# Patient Record
Sex: MALE | Race: WHITE | NOT HISPANIC OR LATINO | Employment: OTHER | URBAN - METROPOLITAN AREA
[De-identification: names, ages, dates, MRNs, and addresses within clinical notes are randomized per-mention and may not be internally consistent; named-entity substitution may affect disease eponyms.]

---

## 2017-08-18 ENCOUNTER — HOSPITAL ENCOUNTER (EMERGENCY)
Facility: HOSPITAL | Age: 52
Discharge: HOME | End: 2017-08-18
Attending: EMERGENCY MEDICINE | Admitting: EMERGENCY MEDICINE

## 2017-08-18 ENCOUNTER — APPOINTMENT (OUTPATIENT)
Dept: CT IMAGING | Facility: HOSPITAL | Age: 52
End: 2017-08-18

## 2017-08-18 VITALS
WEIGHT: 235 LBS | HEIGHT: 70 IN | TEMPERATURE: 96.6 F | RESPIRATION RATE: 16 BRPM | SYSTOLIC BLOOD PRESSURE: 133 MMHG | DIASTOLIC BLOOD PRESSURE: 86 MMHG | HEART RATE: 66 BPM | BODY MASS INDEX: 33.64 KG/M2 | OXYGEN SATURATION: 97 %

## 2017-08-18 DIAGNOSIS — N20.1 URETEROLITHIASIS: Primary | ICD-10-CM

## 2017-08-18 LAB
ALBUMIN SERPL-MCNC: 4.6 G/DL (ref 3.5–5.2)
ALBUMIN/GLOB SERPL: 1.5 G/DL
ALP SERPL-CCNC: 82 U/L (ref 39–117)
ALT SERPL W P-5'-P-CCNC: 26 U/L (ref 1–41)
ANION GAP SERPL CALCULATED.3IONS-SCNC: 16.4 MMOL/L
AST SERPL-CCNC: 22 U/L (ref 1–40)
BACTERIA UR QL AUTO: ABNORMAL /HPF
BASOPHILS # BLD AUTO: 0.04 10*3/MM3 (ref 0–0.2)
BASOPHILS NFR BLD AUTO: 0.4 % (ref 0–1.5)
BILIRUB SERPL-MCNC: 0.4 MG/DL (ref 0.1–1.2)
BILIRUB UR QL STRIP: NEGATIVE
BUN BLD-MCNC: 19 MG/DL (ref 6–20)
BUN/CREAT SERPL: 18.4 (ref 7–25)
CALCIUM SPEC-SCNC: 10 MG/DL (ref 8.6–10.5)
CHLORIDE SERPL-SCNC: 100 MMOL/L (ref 98–107)
CLARITY UR: ABNORMAL
CO2 SERPL-SCNC: 22.6 MMOL/L (ref 22–29)
COLOR UR: ABNORMAL
CREAT BLD-MCNC: 1.03 MG/DL (ref 0.76–1.27)
DEPRECATED RDW RBC AUTO: 40.9 FL (ref 37–54)
EOSINOPHIL # BLD AUTO: 0.12 10*3/MM3 (ref 0–0.7)
EOSINOPHIL NFR BLD AUTO: 1.2 % (ref 0.3–6.2)
ERYTHROCYTE [DISTWIDTH] IN BLOOD BY AUTOMATED COUNT: 12.2 % (ref 11.5–14.5)
GFR SERPL CREATININE-BSD FRML MDRD: 76 ML/MIN/1.73
GLOBULIN UR ELPH-MCNC: 3.1 GM/DL
GLUCOSE BLD-MCNC: 176 MG/DL (ref 65–99)
GLUCOSE UR STRIP-MCNC: ABNORMAL MG/DL
HCT VFR BLD AUTO: 43.9 % (ref 40.4–52.2)
HGB BLD-MCNC: 15.1 G/DL (ref 13.7–17.6)
HGB UR QL STRIP.AUTO: ABNORMAL
HOLD SPECIMEN: NORMAL
HOLD SPECIMEN: NORMAL
HYALINE CASTS UR QL AUTO: ABNORMAL /LPF
IMM GRANULOCYTES # BLD: 0.04 10*3/MM3 (ref 0–0.03)
IMM GRANULOCYTES NFR BLD: 0.4 % (ref 0–0.5)
KETONES UR QL STRIP: ABNORMAL
LEUKOCYTE ESTERASE UR QL STRIP.AUTO: ABNORMAL
LIPASE SERPL-CCNC: 59 U/L (ref 13–60)
LYMPHOCYTES # BLD AUTO: 1.14 10*3/MM3 (ref 0.9–4.8)
LYMPHOCYTES NFR BLD AUTO: 11.8 % (ref 19.6–45.3)
MCH RBC QN AUTO: 31.5 PG (ref 27–32.7)
MCHC RBC AUTO-ENTMCNC: 34.4 G/DL (ref 32.6–36.4)
MCV RBC AUTO: 91.6 FL (ref 79.8–96.2)
MONOCYTES # BLD AUTO: 0.53 10*3/MM3 (ref 0.2–1.2)
MONOCYTES NFR BLD AUTO: 5.5 % (ref 5–12)
NEUTROPHILS # BLD AUTO: 7.82 10*3/MM3 (ref 1.9–8.1)
NEUTROPHILS NFR BLD AUTO: 80.7 % (ref 42.7–76)
NITRITE UR QL STRIP: NEGATIVE
PH UR STRIP.AUTO: 5.5 [PH] (ref 5–8)
PLATELET # BLD AUTO: 239 10*3/MM3 (ref 140–500)
PMV BLD AUTO: 10.2 FL (ref 6–12)
POTASSIUM BLD-SCNC: 3.7 MMOL/L (ref 3.5–5.2)
PROT SERPL-MCNC: 7.7 G/DL (ref 6–8.5)
PROT UR QL STRIP: ABNORMAL
RBC # BLD AUTO: 4.79 10*6/MM3 (ref 4.6–6)
RBC # UR: ABNORMAL /HPF
REF LAB TEST METHOD: ABNORMAL
SODIUM BLD-SCNC: 139 MMOL/L (ref 136–145)
SP GR UR STRIP: 1.02 (ref 1–1.03)
SQUAMOUS #/AREA URNS HPF: ABNORMAL /HPF
UROBILINOGEN UR QL STRIP: ABNORMAL
WBC NRBC COR # BLD: 9.69 10*3/MM3 (ref 4.5–10.7)
WBC UR QL AUTO: ABNORMAL /HPF
WHOLE BLOOD HOLD SPECIMEN: NORMAL
WHOLE BLOOD HOLD SPECIMEN: NORMAL

## 2017-08-18 PROCEDURE — 81001 URINALYSIS AUTO W/SCOPE: CPT | Performed by: EMERGENCY MEDICINE

## 2017-08-18 PROCEDURE — 96376 TX/PRO/DX INJ SAME DRUG ADON: CPT

## 2017-08-18 PROCEDURE — 96361 HYDRATE IV INFUSION ADD-ON: CPT

## 2017-08-18 PROCEDURE — 96375 TX/PRO/DX INJ NEW DRUG ADDON: CPT

## 2017-08-18 PROCEDURE — 25010000002 HYDROMORPHONE PER 4 MG: Performed by: EMERGENCY MEDICINE

## 2017-08-18 PROCEDURE — 25010000002 ONDANSETRON PER 1 MG: Performed by: EMERGENCY MEDICINE

## 2017-08-18 PROCEDURE — 83690 ASSAY OF LIPASE: CPT | Performed by: EMERGENCY MEDICINE

## 2017-08-18 PROCEDURE — 81003 URINALYSIS AUTO W/O SCOPE: CPT | Performed by: EMERGENCY MEDICINE

## 2017-08-18 PROCEDURE — 85025 COMPLETE CBC W/AUTO DIFF WBC: CPT | Performed by: EMERGENCY MEDICINE

## 2017-08-18 PROCEDURE — 99284 EMERGENCY DEPT VISIT MOD MDM: CPT

## 2017-08-18 PROCEDURE — 74176 CT ABD & PELVIS W/O CONTRAST: CPT

## 2017-08-18 PROCEDURE — 25010000002 KETOROLAC TROMETHAMINE PER 15 MG: Performed by: EMERGENCY MEDICINE

## 2017-08-18 PROCEDURE — 80053 COMPREHEN METABOLIC PANEL: CPT | Performed by: EMERGENCY MEDICINE

## 2017-08-18 PROCEDURE — 96374 THER/PROPH/DIAG INJ IV PUSH: CPT

## 2017-08-18 RX ORDER — KETOROLAC TROMETHAMINE 30 MG/ML
30 INJECTION, SOLUTION INTRAMUSCULAR; INTRAVENOUS ONCE
Status: COMPLETED | OUTPATIENT
Start: 2017-08-18 | End: 2017-08-18

## 2017-08-18 RX ORDER — HYDROCODONE BITARTRATE AND ACETAMINOPHEN 7.5; 325 MG/1; MG/1
1 TABLET ORAL EVERY 4 HOURS PRN
Qty: 20 TABLET | Refills: 0 | Status: SHIPPED | OUTPATIENT
Start: 2017-08-18 | End: 2019-07-18

## 2017-08-18 RX ORDER — HYDROMORPHONE HYDROCHLORIDE 1 MG/ML
0.5 INJECTION, SOLUTION INTRAMUSCULAR; INTRAVENOUS; SUBCUTANEOUS ONCE
Status: COMPLETED | OUTPATIENT
Start: 2017-08-18 | End: 2017-08-18

## 2017-08-18 RX ORDER — SODIUM CHLORIDE 0.9 % (FLUSH) 0.9 %
10 SYRINGE (ML) INJECTION AS NEEDED
Status: DISCONTINUED | OUTPATIENT
Start: 2017-08-18 | End: 2017-08-18 | Stop reason: HOSPADM

## 2017-08-18 RX ORDER — ONDANSETRON 8 MG/1
8 TABLET, ORALLY DISINTEGRATING ORAL EVERY 8 HOURS PRN
Qty: 12 TABLET | Refills: 0 | Status: SHIPPED | OUTPATIENT
Start: 2017-08-18 | End: 2019-07-18 | Stop reason: SDUPTHER

## 2017-08-18 RX ORDER — ONDANSETRON 2 MG/ML
4 INJECTION INTRAMUSCULAR; INTRAVENOUS ONCE
Status: COMPLETED | OUTPATIENT
Start: 2017-08-18 | End: 2017-08-18

## 2017-08-18 RX ADMIN — SODIUM CHLORIDE 1000 ML: 9 INJECTION, SOLUTION INTRAVENOUS at 03:18

## 2017-08-18 RX ADMIN — HYDROMORPHONE HYDROCHLORIDE 0.5 MG: 1 INJECTION, SOLUTION INTRAMUSCULAR; INTRAVENOUS; SUBCUTANEOUS at 04:55

## 2017-08-18 RX ADMIN — ONDANSETRON 4 MG: 2 INJECTION INTRAMUSCULAR; INTRAVENOUS at 03:10

## 2017-08-18 RX ADMIN — KETOROLAC TROMETHAMINE 30 MG: 30 INJECTION, SOLUTION INTRAMUSCULAR at 04:55

## 2017-08-18 RX ADMIN — HYDROMORPHONE HYDROCHLORIDE 1 MG: 1 INJECTION, SOLUTION INTRAMUSCULAR; INTRAVENOUS; SUBCUTANEOUS at 03:13

## 2017-08-18 NOTE — ED PROVIDER NOTES
EMERGENCY DEPARTMENT ENCOUNTER    CHIEF COMPLAINT  Chief Complaint: Flank pain  History given by: patient   History limited by: n/a  Room Number: 16/16  PMD: MD Dr Elizabeth Bertrand, urology    HPI:  Pt is a 52 y.o. male who presents complaining of right flank pain that radiates to the right abd onset tonight. Pt states that he had a brief episode of pain 2 nights ago, but states that it resolved. Pt also complains of difficulty urinating, nausea, and vomiting. Pt reports a hx of multiple prior stones, and states that he was able to pass them without intervention except for the most recent one that required lithotripsy.     Onset: sudden  Timing: constant   Location: right flank  Radiation: right abd  Quality: pain  Intensity/Severity: moderate  Progression: unchanged   Associated Symptoms: N/V, difficulty urinating   Aggravating Factors: none  Alleviating Factors: none  Previous Episodes: hx of kidney stones   Treatment before arrival: none    PAST MEDICAL HISTORY  Active Ambulatory Problems     Diagnosis Date Noted   • No Active Ambulatory Problems     Resolved Ambulatory Problems     Diagnosis Date Noted   • No Resolved Ambulatory Problems     Past Medical History:   Diagnosis Date   • Hypertension    • Kidney stones        PAST SURGICAL HISTORY  Past Surgical History:   Procedure Laterality Date   • CHOLECYSTECTOMY     • KIDNEY STONE SURGERY         FAMILY HISTORY  History reviewed. No pertinent family history.    SOCIAL HISTORY  Social History     Social History   • Marital status:      Spouse name: N/A   • Number of children: N/A   • Years of education: N/A     Occupational History   • Not on file.     Social History Main Topics   • Smoking status: Former Smoker   • Smokeless tobacco: Not on file   • Alcohol use 8.4 oz/week     14 Shots of liquor per week   • Drug use: Yes     Special: Marijuana      Comment: occasionally   • Sexual activity: Not on file     Other Topics Concern   • Not on file      Social History Narrative   • No narrative on file       ALLERGIES  Review of patient's allergies indicates no known allergies.    REVIEW OF SYSTEMS  Review of Systems   Constitutional: Negative for chills and fever.   HENT: Negative for congestion and sore throat.    Eyes: Negative.    Respiratory: Negative for cough and shortness of breath.    Cardiovascular: Negative for chest pain and leg swelling.   Gastrointestinal: Positive for nausea and vomiting. Negative for abdominal pain and diarrhea.   Genitourinary: Positive for difficulty urinating and flank pain (right). Negative for dysuria.   Musculoskeletal: Negative for back pain and neck pain.   Skin: Negative for rash and wound.   Allergic/Immunologic: Negative.    Neurological: Negative for dizziness, weakness, numbness and headaches.   Psychiatric/Behavioral: Negative.    All other systems reviewed and are negative.      PHYSICAL EXAM  ED Triage Vitals   Temp Heart Rate Resp BP SpO2   08/18/17 0219 08/18/17 0219 08/18/17 0219 -- 08/18/17 0219   96.6 °F (35.9 °C) 78 26  98 %      Temp src Heart Rate Source Patient Position BP Location FiO2 (%)   08/18/17 0219 08/18/17 0219 -- -- --   Tympanic Monitor          Physical Exam   Constitutional: He is oriented to person, place, and time. He appears distressed (moderate).   HENT:   Head: Normocephalic and atraumatic.   Eyes: EOM are normal. Pupils are equal, round, and reactive to light.   Neck: Normal range of motion. Neck supple.   Cardiovascular: Normal rate, regular rhythm and normal heart sounds.    Pulmonary/Chest: Effort normal and breath sounds normal. No respiratory distress.   Abdominal: Soft. There is no tenderness. There is no rebound and no guarding.   Musculoskeletal: Normal range of motion. He exhibits no edema.   Neurological: He is alert and oriented to person, place, and time. He has normal sensation and normal strength.   Skin: Skin is warm and dry.   Psychiatric: Mood and affect normal.    Nursing note and vitals reviewed.      LAB RESULTS  Lab Results (last 24 hours)     Procedure Component Value Units Date/Time    CBC & Differential [[id number]] Collected:  08/18/17 0310    Specimen:  Blood Updated:  08/18/17 0344    Narrative:       The following orders were created for panel order CBC & Differential.  Procedure                               Abnormality         Status                     ---------                               -----------         ------                     CBC Auto Differential[[id number]]        Abnormal            Final result                 Please view results for these tests on the individual orders.    Comprehensive Metabolic Panel [[id number]]  (Abnormal) Collected:  08/18/17 0310    Specimen:  Blood Updated:  08/18/17 0401     Glucose 176 (H) mg/dL      BUN 19 mg/dL      Creatinine 1.03 mg/dL      Sodium 139 mmol/L      Potassium 3.7 mmol/L      Chloride 100 mmol/L      CO2 22.6 mmol/L      Calcium 10.0 mg/dL      Total Protein 7.7 g/dL      Albumin 4.60 g/dL      ALT (SGPT) 26 U/L      AST (SGOT) 22 U/L      Alkaline Phosphatase 82 U/L      Total Bilirubin 0.4 mg/dL      eGFR Non African Amer 76 mL/min/1.73      Globulin 3.1 gm/dL      A/G Ratio 1.5 g/dL      BUN/Creatinine Ratio 18.4     Anion Gap 16.4 mmol/L     Lipase [[id number]]  (Normal) Collected:  08/18/17 0310    Specimen:  Blood Updated:  08/18/17 0401     Lipase 59 U/L     CBC Auto Differential [[id number]]  (Abnormal) Collected:  08/18/17 0310    Specimen:  Blood Updated:  08/18/17 0344     WBC 9.69 10*3/mm3      RBC 4.79 10*6/mm3      Hemoglobin 15.1 g/dL      Hematocrit 43.9 %      MCV 91.6 fL      MCH 31.5 pg      MCHC 34.4 g/dL      RDW 12.2 %      RDW-SD 40.9 fl      MPV 10.2 fL      Platelets 239 10*3/mm3      Neutrophil % 80.7 (H) %      Lymphocyte % 11.8 (L) %      Monocyte % 5.5 %      Eosinophil % 1.2 %      Basophil % 0.4 %      Immature Grans % 0.4 %      Neutrophils, Absolute 7.82 10*3/mm3       Lymphocytes, Absolute 1.14 10*3/mm3      Monocytes, Absolute 0.53 10*3/mm3      Eosinophils, Absolute 0.12 10*3/mm3      Basophils, Absolute 0.04 10*3/mm3      Immature Grans, Absolute 0.04 (H) 10*3/mm3     Urinalysis With / Culture If Indicated [[id number]]  (Abnormal) Collected:  08/18/17 0358    Specimen:  Urine from Urine, Clean Catch Updated:  08/18/17 0414     Color, UA Red (A)      Any Substance that causes an abnormal urine color can alter the accuracy of the chemical reactions.        Appearance, UA Cloudy (A)     pH, UA 5.5     Specific Gravity, UA 1.022     Glucose,  mg/dL (Trace) (A)     Ketones, UA Trace (A)     Bilirubin, UA Negative     Blood, UA Large (3+) (A)     Protein, UA 30 mg/dL (1+) (A)     Leuk Esterase, UA Trace (A)     Nitrite, UA Negative     Urobilinogen, UA 0.2 E.U./dL    Urinalysis, Microscopic Only [[id number]]  (Abnormal) Collected:  08/18/17 0358    Specimen:  Urine from Urine, Clean Catch Updated:  08/18/17 0417     RBC, UA Too Numerous to Count (A) /HPF      WBC, UA 3-5 (A) /HPF      Bacteria, UA None Seen /HPF      Squamous Epithelial Cells, UA 0-2 /HPF      Hyaline Casts, UA 0-2 /LPF      Methodology Automated Microscopy          I ordered the above labs and reviewed the results    RADIOLOGY  CT Abdomen Pelvis Without Contrast   Final Result   1.  Bilateral nephrolithiasis including a 2 mm distal right ureteral   calculus with mild right hydronephrosis.   2. Mild colonic diverticulosis                   This study was performed with techniques to keep radiation doses as low   as reasonably achievable (ALARA). Individualized dose reduction   techniques using automated exposure control or adjustment of mA and/or   kV according to the patient size were employed.        This report was finalized on 8/18/2017 3:49 AM by Varun Dupont MD.               I ordered the above noted radiological studies. Interpreted by radiologist. Reviewed by me in PACS.        PROCEDURES  Procedures      PROGRESS AND CONSULTS  ED Course     02:31  UA, lipase, CMP, and CBC ordered for further evaluation.     03:00  BP-   HR- 78 Temp- 96.6 °F (35.9 °C) (Tympanic) O2 sat- 98%  Advised pt of the plan to treat pain. Will order labs and CT abd/pelvis. Pt understands and agrees with the plan, all questions answered.    03:03  IVF ordered for hydration. Dilaudid and Zofran ordered to treat pain and nausea. CT abd/pelvis ordered for further evaluation.     04:34  Toradol and Dilaudid ordered to treat pain.     05;27  BP- 142/94 HR- 72 Temp- 96.6 °F (35.9 °C) (Tympanic) O2 sat- 98%  Rechecked the patient who is in NAD and is resting comfortably. Advised pt that the CT shows a 2mm stone. UA does not show a UTI. Plan to treat pain. Pt will be discharged. Pt understands and agrees with the plan, all questions answered.    MEDICAL DECISION MAKING  Results were reviewed/discussed with the patient and they were also made aware of online access. Pt also made aware that some labs, such as cultures, will not be resulted during ER visit and follow up with PMD is necessary.     MDM  Number of Diagnoses or Management Options  Ureterolithiasis:      Amount and/or Complexity of Data Reviewed  Clinical lab tests: ordered and reviewed (UA- too numerous to count RBC's )  Tests in the radiology section of CPT®: ordered and reviewed (CT abd/pelvis shows 1.  Bilateral nephrolithiasis including a 2 mm distal right ureteral  calculus with mild right hydronephrosis.  2. Mild colonic diverticulosis  )    Patient Progress  Patient progress: stable         DIAGNOSIS  Final diagnoses:   Ureterolithiasis       DISPOSITION  DISCHARGE    Patient discharged in stable condition.    Reviewed implications of results, diagnosis, meds, responsibility to follow up, warning signs and symptoms of possible worsening, potential complications and reasons to return to ER.    Patient/Family voiced understanding of above  instructions    Discussed plan for discharge, as there is no emergent indication for admission.  Pt/family is agreeable and understands need for follow up and repeat testing.  Pt is aware that discharge does not mean that nothing is wrong but it indicates no emergency is present that requires admission and they must continue care with follow-up as given below or physician of their choice.     FOLLOW-UP  Rohit Johnson MD  [address] [address]  [phone]               Medication List      New Prescriptions          HYDROcodone-acetaminophen 7.5-325 MG per tablet   Commonly known as:  NORCO   Take 1 tablet by mouth Every 4 (Four) Hours As Needed for Moderate Pain .       ondansetron ODT 8 MG disintegrating tablet   Commonly known as:  ZOFRAN ODT   Take 1 tablet by mouth Every 8 (Eight) Hours As Needed for Nausea or   Vomiting.         Stop          ciprofloxacin 500 MG tablet   Commonly known as:  CIPRO       oxyCODONE-acetaminophen 5-325 MG per tablet   Commonly known as:  PERCOCET       tamsulosin 0.4 MG capsule 24 hr capsule   Commonly known as:  FLOMAX           Latest Documented Vital Signs:  As of 7:12 AM  BP- 133/86 HR- 66 Temp- 96.6 °F (35.9 °C) (Tympanic) O2 sat- 97%    --  Documentation assistance provided by rolan Atrhur for Dr Hodge.  Information recorded by the scribe was done at my direction and has been verified and validated by me.        Cynthia Arthur  08/18/17 0528       Morgan Hodge MD  08/18/17 0201

## 2019-05-30 RX ORDER — LISINOPRIL 20 MG/1
20 TABLET ORAL DAILY
Qty: 90 TABLET | Refills: 0 | Status: SHIPPED | OUTPATIENT
Start: 2019-05-30 | End: 2019-09-05 | Stop reason: SDUPTHER

## 2019-06-19 RX ORDER — HYDROCHLOROTHIAZIDE 25 MG/1
25 TABLET ORAL DAILY
Qty: 30 TABLET | Refills: 0 | Status: SHIPPED | OUTPATIENT
Start: 2019-06-19 | End: 2019-08-19 | Stop reason: SDUPTHER

## 2019-06-19 RX ORDER — HYDROCHLOROTHIAZIDE 25 MG/1
25 TABLET ORAL DAILY
Qty: 30 TABLET | Refills: 0 | Status: SHIPPED | OUTPATIENT
Start: 2019-06-19 | End: 2019-06-19 | Stop reason: SDUPTHER

## 2019-07-18 ENCOUNTER — OFFICE VISIT (OUTPATIENT)
Dept: INTERNAL MEDICINE | Facility: CLINIC | Age: 54
End: 2019-07-18

## 2019-07-18 VITALS
SYSTOLIC BLOOD PRESSURE: 128 MMHG | BODY MASS INDEX: 35 KG/M2 | DIASTOLIC BLOOD PRESSURE: 84 MMHG | HEIGHT: 71 IN | WEIGHT: 250 LBS

## 2019-07-18 DIAGNOSIS — I10 ESSENTIAL HYPERTENSION: Primary | ICD-10-CM

## 2019-07-18 DIAGNOSIS — L82.1 SEBORRHEIC KERATOSES: ICD-10-CM

## 2019-07-18 DIAGNOSIS — M54.17 LUMBOSACRAL RADICULOPATHY AT L3: ICD-10-CM

## 2019-07-18 PROCEDURE — 99214 OFFICE O/P EST MOD 30 MIN: CPT | Performed by: INTERNAL MEDICINE

## 2019-07-18 RX ORDER — TADALAFIL 10 MG/1
10 TABLET ORAL DAILY PRN
COMMUNITY
End: 2021-03-02 | Stop reason: SDUPTHER

## 2019-07-18 RX ORDER — MELOXICAM 15 MG/1
15 TABLET ORAL DAILY
Qty: 90 TABLET | Refills: 0 | Status: SHIPPED | OUTPATIENT
Start: 2019-07-18 | End: 2021-03-02

## 2019-07-18 NOTE — PROGRESS NOTES
Assessment and Plan  Bart was seen today for hypertension and hip pain.    Diagnoses and all orders for this visit:    Essential hypertension  -     Comprehensive Metabolic Panel; Future  -     Lipid Panel With LDL / HDL Ratio; Future    Lumbosacral radiculopathy at L3  Comments:  wants trial of nsaids daily and gentle stretching.  If worsens or fails to imrove will call.    Seborrheic keratoses  Comments:  reassured bengin nature of this skin lesion.    Other orders  -     meloxicam (MOBIC) 15 MG tablet; Take 1 tablet by mouth Daily.      F/U and Patient Instructions    Return in about 6 months (around 1/18/2020) for Annual physical.  There are no Patient Instructions on file for this visit.    Subjective    Bart Tesfaye is a 54 y.o. male being seen in our office today for Hypertension and Hip Pain     History of the Present Illness  HPI  Having pain in the L lateral hip/leg, can radiate down thigh to the knee- bothers him when seated, can wake him at night.  Ibuprofen prn (400 mg) doesn't seem to be helping.  More sore since the weekend with a lot of bending and lifting.   BP at home has not been checked.  No significant exercise.     Patient History        Significant Past History  The following portions of the patient's history were reviewed and updated as appropriate:PMHroutine: Social history , Past Medical History, Allergies, Current Medications, Active Problem List and Health Maintenance              Social History  He  reports that he has quit smoking. He does not have any smokeless tobacco history on file. He reports that he drinks about 8.4 oz of alcohol per week. He reports that he uses drugs. Drug: Marijuana.                         Review of Symptoms  Review of Systems   Constitution: Negative for weight loss.   HENT: Negative.    Cardiovascular: Negative.    Respiratory: Negative.    Skin:        Spot on L shoulder to check   Musculoskeletal: Negative for arthritis, back pain, joint pain and  muscle weakness.   Gastrointestinal: Negative.    Genitourinary: Negative.    Psychiatric/Behavioral: Negative.      Objective  Vital Signs         BP Readings from Last 1 Encounters:   07/18/19 128/84     Wt Readings from Last 3 Encounters:   07/18/19 113 kg (250 lb)   08/18/17 107 kg (235 lb)   06/04/16 99.8 kg (220 lb)   Body mass index is 34.87 kg/m².          Physical Exam   Physical Exam   Constitutional: No distress.   Cardiovascular: Normal rate and regular rhythm.   Pulmonary/Chest: Effort normal and breath sounds normal.   Musculoskeletal: Normal range of motion. He exhibits no edema. Tenderness: mild tenderness L lateral hip.        Left hip: He exhibits normal range of motion and no bony tenderness. Decreased strength: very mild.        Lumbar back: He exhibits no tenderness.   Skin:   Waxy stuck-on lesion L posterior shoulder     Data Reviewed    No results found for this or any previous visit (from the past 2016 hour(s)).

## 2019-08-19 RX ORDER — HYDROCHLOROTHIAZIDE 25 MG/1
25 TABLET ORAL DAILY
Qty: 90 TABLET | Refills: 2 | Status: SHIPPED | OUTPATIENT
Start: 2019-08-19 | End: 2020-06-01

## 2019-09-05 RX ORDER — LISINOPRIL 20 MG/1
20 TABLET ORAL DAILY
Qty: 90 TABLET | Refills: 2 | Status: SHIPPED | OUTPATIENT
Start: 2019-09-05 | End: 2020-01-21 | Stop reason: SDUPTHER

## 2020-01-13 DIAGNOSIS — I10 ESSENTIAL HYPERTENSION: ICD-10-CM

## 2020-01-14 LAB
ALBUMIN SERPL-MCNC: 4.8 G/DL (ref 3.5–5.2)
ALBUMIN/GLOB SERPL: 1.9 G/DL
ALP SERPL-CCNC: 74 U/L (ref 39–117)
ALT SERPL-CCNC: 22 U/L (ref 1–41)
AST SERPL-CCNC: 19 U/L (ref 1–40)
BILIRUB SERPL-MCNC: 0.8 MG/DL (ref 0.2–1.2)
BUN SERPL-MCNC: 13 MG/DL (ref 6–20)
BUN/CREAT SERPL: 14.8 (ref 7–25)
CALCIUM SERPL-MCNC: 9.5 MG/DL (ref 8.6–10.5)
CHLORIDE SERPL-SCNC: 99 MMOL/L (ref 98–107)
CHOLEST SERPL-MCNC: 171 MG/DL (ref 0–200)
CO2 SERPL-SCNC: 25.7 MMOL/L (ref 22–29)
CREAT SERPL-MCNC: 0.88 MG/DL (ref 0.76–1.27)
GLOBULIN SER CALC-MCNC: 2.5 GM/DL
GLUCOSE SERPL-MCNC: 106 MG/DL (ref 65–99)
HDLC SERPL-MCNC: 37 MG/DL (ref 40–60)
LDLC SERPL CALC-MCNC: 105 MG/DL (ref 0–100)
LDLC/HDLC SERPL: 2.84 {RATIO}
POTASSIUM SERPL-SCNC: 4.1 MMOL/L (ref 3.5–5.2)
PROT SERPL-MCNC: 7.3 G/DL (ref 6–8.5)
SODIUM SERPL-SCNC: 137 MMOL/L (ref 136–145)
TRIGL SERPL-MCNC: 145 MG/DL (ref 0–150)
VLDLC SERPL CALC-MCNC: 29 MG/DL

## 2020-01-21 ENCOUNTER — OFFICE VISIT (OUTPATIENT)
Dept: INTERNAL MEDICINE | Facility: CLINIC | Age: 55
End: 2020-01-21

## 2020-01-21 VITALS
BODY MASS INDEX: 34.16 KG/M2 | SYSTOLIC BLOOD PRESSURE: 138 MMHG | HEART RATE: 76 BPM | DIASTOLIC BLOOD PRESSURE: 74 MMHG | WEIGHT: 244 LBS | HEIGHT: 71 IN

## 2020-01-21 DIAGNOSIS — Z00.00 ANNUAL PHYSICAL EXAM: ICD-10-CM

## 2020-01-21 DIAGNOSIS — I10 ESSENTIAL HYPERTENSION: Primary | ICD-10-CM

## 2020-01-21 PROBLEM — N20.0 KIDNEY STONES: Status: ACTIVE | Noted: 2020-01-21

## 2020-01-21 PROCEDURE — 99396 PREV VISIT EST AGE 40-64: CPT | Performed by: INTERNAL MEDICINE

## 2020-01-21 RX ORDER — AMLODIPINE BESYLATE 10 MG/1
10 TABLET ORAL DAILY
Qty: 90 TABLET | Refills: 1 | Status: SHIPPED | OUTPATIENT
Start: 2020-01-21 | End: 2020-08-03

## 2020-01-21 RX ORDER — LISINOPRIL 20 MG/1
30 TABLET ORAL DAILY
Qty: 145 TABLET | Refills: 2 | Status: SHIPPED | OUTPATIENT
Start: 2020-01-21 | End: 2020-11-02

## 2020-01-21 NOTE — PROGRESS NOTES
Subjective   Bart Tesfaye is a 54 y.o. male and is here for a comprehensive physical exam. The patient reports problems - blood pressure elevated.  Lately, seems to be about 135/85.  In November, it was high enough to cause headaches.  Has lost about 15 lbs since then.   He is watching nighttime snacking.  Walking when weather allows.  Takes the meloxicam prn only now- less than weekly.  Sees  annually for JT for prostate/kidney stones.            Social History:   Social History     Socioeconomic History   • Marital status:      Spouse name: Not on file   • Number of children: Not on file   • Years of education: Not on file   • Highest education level: Not on file   Tobacco Use   • Smoking status: Former Smoker   • Smokeless tobacco: Never Used   Substance and Sexual Activity   • Alcohol use: Yes     Alcohol/week: 14.0 standard drinks     Types: 14 Shots of liquor per week   • Drug use: Yes     Types: Marijuana     Comment: occasionally   • Sexual activity: Yes     Partners: Female     Birth control/protection: Post-menopausal       Family History:   Family History   Problem Relation Age of Onset   • Hypertension Father    • Dementia Father    • Nephrolithiasis Father    • Colon polyps Sister    • Seizures Sister    • Nephrolithiasis Sister        Past Medical History:   Past Medical History:   Diagnosis Date   • Colon polyps    • Hyperlipidemia    • Hypertension    • Kidney stones        Medications:   Current Outpatient Medications:   •  amLODIPine (NORVASC) 10 MG tablet, Take 10 mg by mouth daily., Disp: , Rfl:   •  hydrochlorothiazide (HYDRODIURIL) 25 MG tablet, Take 1 tablet by mouth Daily., Disp: 90 tablet, Rfl: 2  •  lisinopril (PRINIVIL,ZESTRIL) 20 MG tablet, Take 1.5 tablets by mouth Daily., Disp: 145 tablet, Rfl: 2  •  meloxicam (MOBIC) 15 MG tablet, Take 1 tablet by mouth Daily., Disp: 90 tablet, Rfl: 0  •  tadalafil (CIALIS) 10 MG tablet, Take 10 mg by mouth Daily As Needed for  "erectile dysfunction., Disp: , Rfl:     Review of Systems    Review of Systems   Constitution: Positive for weight loss.   Cardiovascular: Negative.    Endocrine: Negative.    Musculoskeletal: Negative.    Gastrointestinal: Negative.    Genitourinary: Negative.    Neurological: Negative.    Psychiatric/Behavioral: Negative.        There were no vitals filed for this visit.    Vitals:    01/21/20 1018   BP: 138/74   Pulse: 76   Weight: 111 kg (244 lb)   Height: 180.3 cm (71\")     Body mass index is 34.03 kg/m².    Objective     Physical Exam   Constitutional: He is oriented to person, place, and time. No distress.   HENT:   Right Ear: External ear normal.   Left Ear: External ear normal.   Mouth/Throat: No oropharyngeal exudate.   Eyes: Conjunctivae are normal. No scleral icterus.   Neck: Normal range of motion. No thyromegaly present.   Cardiovascular: Normal rate, regular rhythm, normal heart sounds and intact distal pulses.   Pulmonary/Chest: Effort normal and breath sounds normal.   Abdominal: Bowel sounds are normal.   Musculoskeletal: Normal range of motion. He exhibits no edema.   Lymphadenopathy:     He has no cervical adenopathy.   Neurological: He is alert and oriented to person, place, and time.   Skin: Skin is warm and dry.   Psychiatric: He has a normal mood and affect. His behavior is normal. Thought content normal.       Procedures       Assessment/Plan     Bart was seen today for annual exam.    Diagnoses and all orders for this visit:    Essential hypertension  Comments:  increase lisinopril to 30 mg daily and follow- recheck 3 months.  Check BP at home.     Annual physical exam  Comments:  Continue on the reduced calories!! Increase exercise to min 150 minutes/week.  Refuses flu shot today. Shingrix recommended.    Other orders  -     lisinopril (PRINIVIL,ZESTRIL) 20 MG tablet; Take 1.5 tablets by mouth Daily.        Return in about 3 months (around 4/21/2020).           "

## 2020-03-10 ENCOUNTER — OFFICE VISIT (OUTPATIENT)
Dept: INTERNAL MEDICINE | Facility: CLINIC | Age: 55
End: 2020-03-10

## 2020-03-10 VITALS — WEIGHT: 242 LBS | HEIGHT: 71 IN | BODY MASS INDEX: 33.88 KG/M2

## 2020-03-10 DIAGNOSIS — L08.9 INFECTED SEBACEOUS CYST: Primary | ICD-10-CM

## 2020-03-10 DIAGNOSIS — I10 ESSENTIAL HYPERTENSION: ICD-10-CM

## 2020-03-10 DIAGNOSIS — L72.3 INFECTED SEBACEOUS CYST: Primary | ICD-10-CM

## 2020-03-10 PROCEDURE — 99213 OFFICE O/P EST LOW 20 MIN: CPT | Performed by: INTERNAL MEDICINE

## 2020-03-10 RX ORDER — CEPHALEXIN 500 MG/1
500 CAPSULE ORAL 3 TIMES DAILY
Qty: 21 CAPSULE | Refills: 0 | Status: SHIPPED | OUTPATIENT
Start: 2020-03-10 | End: 2020-07-07

## 2020-03-10 NOTE — PROGRESS NOTES
Assessment and Plan  Bart was seen today for cyst.    Diagnoses and all orders for this visit:    Infected sebaceous cyst  Comments:  Keflex since he is going to have it excised.  Set up with gen surgeon.  Orders:  -     Ambulatory Referral to General Surgery    Essential hypertension  Comments:  Doing great, no change.    Other orders  -     cephalexin (KEFLEX) 500 MG capsule; Take 1 capsule by mouth 3 (Three) Times a Day.      F/U and Patient Instructions    No follow-ups on file.  There are no Patient Instructions on file for this visit.    Subjective    Bart Tesfaey is a 54 y.o. male being seen in our office today for Cyst (back of neck )     History of the Present Illness  HPI Cyst on neck has gotten worse, was sore but burst last night and is draining today.  Would like advise.  Had infected cyst removed from back several years ago.  BP has been good.    Patient History        Significant Past History  The following portions of the patient's history were reviewed and updated as appropriate:PMHroutine: Social history , Allergies, Current Medications, Active Problem List and Health Maintenance              Social History  He  reports that he has quit smoking. He has never used smokeless tobacco. He reports that he drinks about 14.0 standard drinks of alcohol per week. He reports that he has current or past drug history. Drug: Marijuana.                         Review of Symptoms  Review of Systems   Constitution: Negative.   Cardiovascular: Negative.    Skin:        Draining cyst behind R ear     Objective  Vital Signs         BP Readings from Last 1 Encounters:   01/21/20 138/74     Wt Readings from Last 3 Encounters:   03/10/20 110 kg (242 lb)   01/21/20 111 kg (244 lb)   07/18/19 113 kg (250 lb)   Body mass index is 33.75 kg/m².          Physical Exam   Physical Exam   Constitutional: No distress.   Skin:   Behind R ear- raised, erythematous mass, tender, draining purulent fluid with pressure.        Data Reviewed    Recent Results (from the past 2016 hour(s))   Lipid Panel With LDL / HDL Ratio    Collection Time: 01/14/20  9:17 AM   Result Value Ref Range    Total Cholesterol 171 0 - 200 mg/dL    Triglycerides 145 0 - 150 mg/dL    HDL Cholesterol 37 (L) 40 - 60 mg/dL    VLDL Cholesterol 29 mg/dL    LDL Cholesterol  105 (H) 0 - 100 mg/dL    LDL/HDL Ratio 2.84    Comprehensive Metabolic Panel    Collection Time: 01/14/20  9:17 AM   Result Value Ref Range    Glucose 106 (H) 65 - 99 mg/dL    BUN 13 6 - 20 mg/dL    Creatinine 0.88 0.76 - 1.27 mg/dL    eGFR Non African Am 90 >60 mL/min/1.73    eGFR African Am 109 >60 mL/min/1.73    BUN/Creatinine Ratio 14.8 7.0 - 25.0    Sodium 137 136 - 145 mmol/L    Potassium 4.1 3.5 - 5.2 mmol/L    Chloride 99 98 - 107 mmol/L    Total CO2 25.7 22.0 - 29.0 mmol/L    Calcium 9.5 8.6 - 10.5 mg/dL    Total Protein 7.3 6.0 - 8.5 g/dL    Albumin 4.80 3.50 - 5.20 g/dL    Globulin 2.5 gm/dL    A/G Ratio 1.9 g/dL    Total Bilirubin 0.8 0.2 - 1.2 mg/dL    Alkaline Phosphatase 74 39 - 117 U/L    AST (SGOT) 19 1 - 40 U/L    ALT (SGPT) 22 1 - 41 U/L

## 2020-03-13 ENCOUNTER — OFFICE VISIT (OUTPATIENT)
Dept: SURGERY | Facility: CLINIC | Age: 55
End: 2020-03-13

## 2020-03-13 VITALS — HEIGHT: 71 IN | OXYGEN SATURATION: 98 % | WEIGHT: 240 LBS | BODY MASS INDEX: 33.6 KG/M2 | HEART RATE: 87 BPM

## 2020-03-13 DIAGNOSIS — L72.3 INFECTED SEBACEOUS CYST: Primary | ICD-10-CM

## 2020-03-13 DIAGNOSIS — L08.9 INFECTED SEBACEOUS CYST: Primary | ICD-10-CM

## 2020-03-13 PROCEDURE — 99203 OFFICE O/P NEW LOW 30 MIN: CPT | Performed by: SURGERY

## 2020-03-13 RX ORDER — SULFAMETHOXAZOLE AND TRIMETHOPRIM 800; 160 MG/1; MG/1
1 TABLET ORAL 2 TIMES DAILY
Qty: 14 TABLET | Refills: 0 | Status: SHIPPED | OUTPATIENT
Start: 2020-03-13 | End: 2020-03-20

## 2020-03-13 NOTE — PROGRESS NOTES
General Surgery  Initial Office Visit    CC: Infected sebaceous cyst    HPI: The patient is a pleasant 54 y.o. year-old gentleman who presents today for evaluation of an infected sebaceous cyst behind his right ear that has been present for one week.  The cyst itself has been present for much longer, as he recalls first noticing it back around Thanksgiving of 2019.  It has only shown signs of infection for the last 2 weeks.  He mentioned it to his primary care doctor 2 days ago, and was started on Keflex.  He says that right after leaving his primary care doctor's office, the cyst came to ahead and ruptured and has had copious drainage that has been decreasing in quantity each day.  He says since the cyst ruptured, the size of it has dramatically improved as has the surrounding skin erythema and tenderness.     Past Medical History:   Hypertension  History of kidney stones  History of colon polyps    Past Surgical History:   Colonoscopy (2019 at [facility])  Right retinal detachment repair (2016)  Cholecystectomy (2015)  Cystoscopy with lithotripsy  Broken arm fracture repair    Medications:   Amlodipine 10 mg daily  Keflex 3 times daily x7 days  Hydrochlorothiazide 25 mg daily  Lisinopril 20 mg daily  Cialis as needed  Meloxicam    Allergies: No known drug allergies    Family History: Father with dementia and kidney stones, sister with colon polyps and kidney stones    Social History: Social marijuana use, former smoker, social alcohol use, retired    ROS:   Constitutional: Negative for fevers or chills  HENT: Positive for runny nose; negative for hearing loss or sneezing  Eyes: Negative for vision changes or scleral icterus  Respiratory: Positive for cough; negative for wheezing or shortness of breath  Cardiovascular: Negative for chest pain or heart palpitations  Gastrointestinal: Negative for abdominal pain, nausea, vomiting, constipation, melena, or hematochezia  Genitourinary: Negative for hematuria or  dysuria  Musculoskeletal: Negative for joint swelling or gait instability  Neurologic: Negative for tremors or seizures  Psychiatric: Negative for suicidal ideations or depression  Skin: Positive for drainage and cellulitis  All other systems reviewed and negative    Physical Exam:  Vitals:    03/13/20 1008   Pulse: 87   SpO2: 98%     Height: 180 cm  Weight: 109 kg  BMI: 33.5  General: No acute distress, well-nourished & well-developed  HEAD: normocephalic, atraumatic  EYES: normal conjunctiva, sclera anicteric  EARS: grossly normal hearing  NECK: supple, no thyromegaly  CARDIOVASCULAR: regular rate and rhythm  RESPIRATORY: clear to auscultation bilaterally  GASTROINTESTINAL: soft, nontender, non-distended  MUSCULOSKELETAL: normal gait and station. No gross extremity abnormalities  PSYCHIATRIC: oriented x3, normal mood and affect  SKIN: 1 cm sebaceous cyst behind right ear with open large pore at its center and some purulent tissue dried at the opening, no real residual erythema or fluctuance remaining      ASSESSMENT & PLAN  Mr. Tesfaye is a 54-year-old gentleman with a recently infected sebaceous cyst behind his right ear that has spontaneously drained on its own.  He should continue taking the Keflex to completion and I have also given him a prescription for Bactrim DS to help cover for any possible MRSA.  I would like for him to come back in about 4 weeks, at which point I will excise the cyst here in the office under local anesthetic.  He should call sooner for any recurrent symptoms requiring incision and drainage.    Anabelle Bolanos MD  General and Endoscopic Surgery  [facility]    [address], [address]  [place], [place], [facility]  P: [phone]  F: [phone]

## 2020-04-16 ENCOUNTER — PROCEDURE VISIT (OUTPATIENT)
Dept: SURGERY | Facility: CLINIC | Age: 55
End: 2020-04-16

## 2020-04-16 DIAGNOSIS — L72.9 SUBCUTANEOUS CYST: Primary | ICD-10-CM

## 2020-04-16 PROCEDURE — 11420 EXC H-F-NK-SP B9+MARG 0.5/<: CPT | Performed by: SURGERY

## 2020-04-16 PROCEDURE — 88304 TISSUE EXAM BY PATHOLOGIST: CPT | Performed by: SURGERY

## 2020-04-16 NOTE — PROGRESS NOTES
General Surgery  In Office Procedure Note:  Pre-Operative Diagnosis: Right posterior auricular sebaceous cyst  Post-Operative Diagnosis: Same  Procedure performed: Excision of right posterior auricular 5mm sebaceous cyst  Surgeon: Anabelle Bolanos MD  Assistant: None  Anesthesia: Local (1% Lidocaine with epinephrine)  Complications: None  EBL: Minimal  Specimens: Right posterior ear cyst  Description of Procedure: The patient was brought to the minor procedure room and erika in the left lateral decubitus position.  His right posterior auricular skin was prepped and draped in a sterile fashion using Hibiclens soap and a surgical timeout completed.  The sebaceous cyst was anesthetized at the skin level using 1% lidocaine with epinephrine and excised via an elliptical skin incision down to the subcutaneous fat.  The cyst measured about 5 mm in maximal diameter and was passed off to pathology in formalin.  The incision was closed primarily using interrupted 4-0 nylon skin stitches.  He tolerated the procedure well and will have his wife remove his sutures in 2 weeks.  I advised him that I will call him with the pathology results early next week.    Anabelle Bolanos MD  General and Endoscopic Surgery  [facility]    [address], [address]  [place], [place], [address]  P: [phone]  F: [phone]

## 2020-04-21 ENCOUNTER — TELEPHONE (OUTPATIENT)
Dept: SURGERY | Facility: CLINIC | Age: 55
End: 2020-04-21

## 2020-04-21 LAB
CYTO UR: NORMAL
LAB AP CASE REPORT: NORMAL
PATH REPORT.FINAL DX SPEC: NORMAL
PATH REPORT.GROSS SPEC: NORMAL

## 2020-04-21 NOTE — TELEPHONE ENCOUNTER
I called Bart and relayed the benign pathology findings of a normal sebaceous cyst showing some chronic inflammation.  I left a voicemail as he did not  his phone.

## 2020-06-01 RX ORDER — HYDROCHLOROTHIAZIDE 25 MG/1
25 TABLET ORAL DAILY
Qty: 90 TABLET | Refills: 2 | Status: SHIPPED | OUTPATIENT
Start: 2020-06-01 | End: 2021-02-24

## 2020-07-07 ENCOUNTER — OFFICE VISIT (OUTPATIENT)
Dept: INTERNAL MEDICINE | Facility: CLINIC | Age: 55
End: 2020-07-07

## 2020-07-07 VITALS
BODY MASS INDEX: 33.74 KG/M2 | TEMPERATURE: 97.4 F | HEIGHT: 71 IN | WEIGHT: 241 LBS | HEART RATE: 76 BPM | DIASTOLIC BLOOD PRESSURE: 76 MMHG | SYSTOLIC BLOOD PRESSURE: 128 MMHG

## 2020-07-07 DIAGNOSIS — L98.9 SKIN LESION OF FACE: ICD-10-CM

## 2020-07-07 DIAGNOSIS — I10 ESSENTIAL HYPERTENSION: Primary | ICD-10-CM

## 2020-07-07 DIAGNOSIS — Z11.59 ENCOUNTER FOR HEPATITIS C SCREENING TEST FOR LOW RISK PATIENT: ICD-10-CM

## 2020-07-07 PROCEDURE — 99213 OFFICE O/P EST LOW 20 MIN: CPT | Performed by: INTERNAL MEDICINE

## 2020-07-07 NOTE — PROGRESS NOTES
Assessment and Plan  Bart was seen today for hypertension.    Diagnoses and all orders for this visit:    Essential hypertension  -     Comprehensive Metabolic Panel; Future  -     Lipid Panel With / Chol / HDL Ratio; Future    Encounter for hepatitis C screening test for low risk patient  -     Hepatitis C Antibody; Future    Skin lesion of face  Comments:  new problem- to see derm.  Orders:  -     Ambulatory Referral to Dermatology      F/U and Patient Instructions    Return in about 6 months (around 1/7/2021) for Annual physical, Lab Before FUP.  There are no Patient Instructions on file for this visit.    Subjective    Bart Tesfaye is a 55 y.o. male being seen in our office today for Hypertension     History of the Present Illness  HPI  BP f/u- no new issues, has increased exercise.  Edema in ankles has gotten better, now essentially resolved.     Had sebaceous cyst removed- no issues.    Patient History        Significant Past History  The following portions of the patient's history were reviewed and updated as appropriate:PMHroutine: Social history , Allergies, Current Medications, Active Problem List and Health Maintenance              Social History  He  reports that he has quit smoking. He has never used smokeless tobacco. He reports that he drinks alcohol. He reports that he has current or past drug history. Drug: Marijuana.                         Review of Symptoms  Review of Systems   Constitution: Negative for weight loss.   Cardiovascular: Negative.    Respiratory: Negative.    Musculoskeletal: Negative.    Psychiatric/Behavioral: Negative.      Objective  Vital Signs         BP Readings from Last 1 Encounters:   07/07/20 128/76     Wt Readings from Last 3 Encounters:   07/07/20 109 kg (241 lb)   03/13/20 109 kg (240 lb)   03/10/20 110 kg (242 lb)   Body mass index is 33.61 kg/m².          Physical Exam   Physical Exam   Constitutional: No distress.   Cardiovascular: Normal rate and regular  "rhythm.   Musculoskeletal: He exhibits no edema.   Skin:   Lesion on nose, scaly, does not heal for several months.     Data Reviewed    Recent Results (from the past 2016 hour(s))   Tissue Pathology Exam    Collection Time: 04/16/20  4:20 PM   Result Value Ref Range    Case Report       Surgical Pathology Report                         Case: [id number]                                  Authorizing Provider:  Anabelle Bolanos MD     Collected:           04/16/2020 04:20 PM          Ordering Location:     [facility]     Received:            04/20/2020 07:07 AM                                 GROUP GENERAL SURGERY                                                        Pathologist:           Yocasta Li MD                                                          Specimen:    Ear, Right, Posterior                                                                      Final Diagnosis       1. Skin, Posterior Ear, Excision: Skin and subcutaneous tissue with   A. Epidermal inclusion cyst with surrounding fibrosis and mixed but predominantly chronic inflammation.    [facility]/pkm       Gross Description       1. Received in formalin labeled \"posterior right ear\" is a 1.0 x 0.6 x 0.3 cm tan-white irregular rubbery skin shave which is inked at the margin, serially sectioned and entirely submitted as 1A.    Jap/uso/jat/kds      Microscopic Description       Performed, incorporated in diagnosis.             "

## 2020-08-03 RX ORDER — AMLODIPINE BESYLATE 10 MG/1
10 TABLET ORAL DAILY
Qty: 90 TABLET | Refills: 1 | Status: SHIPPED | OUTPATIENT
Start: 2020-08-03 | End: 2020-12-03 | Stop reason: SDUPTHER

## 2020-11-02 RX ORDER — LISINOPRIL 20 MG/1
TABLET ORAL
Qty: 145 TABLET | Refills: 2 | Status: SHIPPED | OUTPATIENT
Start: 2020-11-02 | End: 2021-11-29

## 2020-12-03 RX ORDER — AMLODIPINE BESYLATE 10 MG/1
10 TABLET ORAL DAILY
Qty: 90 TABLET | Refills: 1 | Status: SHIPPED | OUTPATIENT
Start: 2020-12-03 | End: 2021-08-02

## 2020-12-07 ENCOUNTER — RESULTS ENCOUNTER (OUTPATIENT)
Dept: INTERNAL MEDICINE | Facility: CLINIC | Age: 55
End: 2020-12-07

## 2020-12-07 DIAGNOSIS — I10 ESSENTIAL HYPERTENSION: ICD-10-CM

## 2020-12-07 DIAGNOSIS — Z11.59 ENCOUNTER FOR HEPATITIS C SCREENING TEST FOR LOW RISK PATIENT: ICD-10-CM

## 2020-12-11 ENCOUNTER — TELEPHONE (OUTPATIENT)
Dept: INTERNAL MEDICINE | Facility: CLINIC | Age: 55
End: 2020-12-11

## 2020-12-11 NOTE — TELEPHONE ENCOUNTER
PATIENT HAS INFLAMED/INGROWN TOENAIL (RIGHT BIG TOE)  EMBEDDED PRETTY DEEP AND WOULD LIKE REFERRAL TO [facility]    [phone]

## 2021-01-07 LAB
ALBUMIN SERPL-MCNC: 4.4 G/DL (ref 3.5–5.2)
ALBUMIN/GLOB SERPL: 1.8 G/DL
ALP SERPL-CCNC: 90 U/L (ref 39–117)
ALT SERPL-CCNC: 21 U/L (ref 1–41)
AST SERPL-CCNC: 18 U/L (ref 1–40)
BILIRUB SERPL-MCNC: 0.3 MG/DL (ref 0–1.2)
BUN SERPL-MCNC: 16 MG/DL (ref 6–20)
BUN/CREAT SERPL: 18.4 (ref 7–25)
CALCIUM SERPL-MCNC: 9.4 MG/DL (ref 8.6–10.5)
CHLORIDE SERPL-SCNC: 101 MMOL/L (ref 98–107)
CHOLEST SERPL-MCNC: 211 MG/DL (ref 0–200)
CHOLEST/HDLC SERPL: 4.49 {RATIO}
CO2 SERPL-SCNC: 26.1 MMOL/L (ref 22–29)
CREAT SERPL-MCNC: 0.87 MG/DL (ref 0.76–1.27)
GLOBULIN SER CALC-MCNC: 2.5 GM/DL
GLUCOSE SERPL-MCNC: 103 MG/DL (ref 65–99)
HCV AB S/CO SERPL IA: <0.1 S/CO RATIO (ref 0–0.9)
HDLC SERPL-MCNC: 47 MG/DL (ref 40–60)
LDLC SERPL CALC-MCNC: 139 MG/DL (ref 0–100)
POTASSIUM SERPL-SCNC: 4.4 MMOL/L (ref 3.5–5.2)
PROT SERPL-MCNC: 6.9 G/DL (ref 6–8.5)
SODIUM SERPL-SCNC: 136 MMOL/L (ref 136–145)
TRIGL SERPL-MCNC: 141 MG/DL (ref 0–150)
VLDLC SERPL CALC-MCNC: 25 MG/DL (ref 5–40)

## 2021-02-24 RX ORDER — HYDROCHLOROTHIAZIDE 25 MG/1
TABLET ORAL
Qty: 90 TABLET | Refills: 2 | Status: SHIPPED | OUTPATIENT
Start: 2021-02-24 | End: 2021-11-24

## 2021-03-02 ENCOUNTER — OFFICE VISIT (OUTPATIENT)
Dept: INTERNAL MEDICINE | Facility: CLINIC | Age: 56
End: 2021-03-02

## 2021-03-02 VITALS
BODY MASS INDEX: 33.74 KG/M2 | WEIGHT: 241 LBS | HEIGHT: 71 IN | HEART RATE: 80 BPM | DIASTOLIC BLOOD PRESSURE: 80 MMHG | SYSTOLIC BLOOD PRESSURE: 134 MMHG | TEMPERATURE: 97.3 F

## 2021-03-02 DIAGNOSIS — I10 ESSENTIAL HYPERTENSION: Primary | ICD-10-CM

## 2021-03-02 DIAGNOSIS — Z00.00 PHYSICAL EXAM, ANNUAL: ICD-10-CM

## 2021-03-02 DIAGNOSIS — N20.0 KIDNEY STONES: ICD-10-CM

## 2021-03-02 DIAGNOSIS — E78.2 MIXED HYPERLIPIDEMIA: ICD-10-CM

## 2021-03-02 PROCEDURE — 99396 PREV VISIT EST AGE 40-64: CPT | Performed by: INTERNAL MEDICINE

## 2021-03-02 RX ORDER — TADALAFIL 10 MG/1
10 TABLET ORAL DAILY PRN
Qty: 30 TABLET | Refills: 1 | Status: SHIPPED | OUTPATIENT
Start: 2021-03-02

## 2021-03-02 NOTE — PROGRESS NOTES
Subjective   Bart Tesfaye is a 55 y.o. male and is here for a comprehensive physical exam. The patient reports no problems.  He is having kitchen renovation so that is stressful, but overall well.  Kidney stone over summer passed on his own. He is on HCTZ and water daily.  Few BP readings @ 130/80.            Social History:   Social History     Socioeconomic History   • Marital status:      Spouse name: Not on file   • Number of children: Not on file   • Years of education: Not on file   • Highest education level: Not on file   Tobacco Use   • Smoking status: Former Smoker   • Smokeless tobacco: Never Used   Substance and Sexual Activity   • Alcohol use: Yes     Comment: 3-4x per week   • Drug use: Yes     Types: Marijuana     Comment: occasionally   • Sexual activity: Yes     Partners: Female     Birth control/protection: Post-menopausal       Family History:   Family History   Problem Relation Age of Onset   • Hypertension Father    • Dementia Father    • Nephrolithiasis Father    • Colon polyps Sister    • Seizures Sister    • Nephrolithiasis Sister        Past Medical History:   Past Medical History:   Diagnosis Date   • Colon polyps    • Hyperlipidemia    • Hypertension    • Kidney stones        Medications:   Current Outpatient Medications:   •  amLODIPine (NORVASC) 10 MG tablet, Take 1 tablet by mouth Daily., Disp: 90 tablet, Rfl: 1  •  hydroCHLOROthiazide (HYDRODIURIL) 25 MG tablet, TAKE 1 TABLET BY MOUTH EVERY DAY, Disp: 90 tablet, Rfl: 2  •  lisinopril (PRINIVIL,ZESTRIL) 20 MG tablet, TAKE 1 AND 1/2 TABLETS BY MOUTH DAILY, Disp: 145 tablet, Rfl: 2  •  tadalafil (Cialis) 10 MG tablet, Take 1 tablet by mouth Daily As Needed for Erectile Dysfunction., Disp: 30 tablet, Rfl: 1    Review of Systems    Review of Systems   Constitutional: Negative for fatigue and unexpected weight gain.   HENT: Negative for dental problem and hearing loss.    Eyes: Negative for visual disturbance.   Respiratory:  "Negative for shortness of breath.    Cardiovascular: Negative for chest pain and leg swelling.   Gastrointestinal: Negative for abdominal pain, blood in stool and indigestion.   Genitourinary: Negative for decreased libido and erectile dysfunction.   Musculoskeletal: Negative for arthralgias.   Neurological: Negative for memory problem.   Psychiatric/Behavioral: Negative for sleep disturbance and depressed mood.        There were no vitals filed for this visit.    Vitals:    03/02/21 1326   BP: 134/80   Pulse: 80   Temp: 97.3 °F (36.3 °C)   Weight: 109 kg (241 lb)   Height: 180.3 cm (71\")     Body mass index is 33.61 kg/m².  Wt Readings from Last 3 Encounters:   03/02/21 109 kg (241 lb)   07/07/20 109 kg (241 lb)   03/13/20 109 kg (240 lb)     Objective     Physical Exam  Constitutional:       General: He is not in acute distress.  Eyes:      Conjunctiva/sclera: Conjunctivae normal.   Neck:      Thyroid: No thyromegaly.   Cardiovascular:      Rate and Rhythm: Normal rate and regular rhythm.      Heart sounds: Normal heart sounds.   Pulmonary:      Effort: Pulmonary effort is normal.      Breath sounds: Normal breath sounds.   Abdominal:      General: Bowel sounds are normal.      Palpations: Abdomen is soft.   Musculoskeletal:         General: No tenderness.   Lymphadenopathy:      Cervical: No cervical adenopathy.   Skin:     General: Skin is warm and dry.   Neurological:      Mental Status: He is alert and oriented to person, place, and time.   Psychiatric:         Behavior: Behavior normal.         Thought Content: Thought content normal.         Judgment: Judgment normal.         Procedures  The 10-year ASCVD risk score ([place] LEACH Jr., et al., 2013) is: 8.6%    Values used to calculate the score:      Age: 55 years      Sex: Male      Is Non- : No      Diabetic: No      Tobacco smoker: No      Systolic Blood Pressure: 134 mmHg      Is BP treated: Yes      HDL Cholesterol: 47 mg/dL      " Total Cholesterol: 211 mg/dL       Assessment/Plan     Diagnoses and all orders for this visit:    1. Essential hypertension (Primary)  Comments:  controlled, would like to see if even lower- discussed association with weight.    2. Kidney stones  Comments:  on HCTZ    3. Mixed hyperlipidemia  Comments:  a bit up but he does not want to start statins despite risk > 7.5%  Will work on other risk factors like weight and recheck     4. Physical exam, annual  Comments:  The above addressed- Shingrix after Covid vaccine- weight and exercise management discussed.  Will recheck in 6 months or prn.    Other orders  -     tadalafil (Cialis) 10 MG tablet; Take 1 tablet by mouth Daily As Needed for Erectile Dysfunction.  Dispense: 30 tablet; Refill: 1        Return in about 6 months (around 9/2/2021) for Recheck.

## 2021-08-02 RX ORDER — AMLODIPINE BESYLATE 10 MG/1
TABLET ORAL
Qty: 90 TABLET | Refills: 0 | Status: SHIPPED | OUTPATIENT
Start: 2021-08-02 | End: 2021-10-27

## 2021-09-02 ENCOUNTER — OFFICE VISIT (OUTPATIENT)
Dept: INTERNAL MEDICINE | Facility: CLINIC | Age: 56
End: 2021-09-02

## 2021-09-02 VITALS
TEMPERATURE: 97.5 F | SYSTOLIC BLOOD PRESSURE: 128 MMHG | HEART RATE: 76 BPM | DIASTOLIC BLOOD PRESSURE: 84 MMHG | WEIGHT: 243 LBS | HEIGHT: 71 IN | BODY MASS INDEX: 34.02 KG/M2

## 2021-09-02 DIAGNOSIS — I10 ESSENTIAL HYPERTENSION: Primary | ICD-10-CM

## 2021-09-02 DIAGNOSIS — K13.0 LESION OF LIP: ICD-10-CM

## 2021-09-02 PROCEDURE — 99213 OFFICE O/P EST LOW 20 MIN: CPT | Performed by: INTERNAL MEDICINE

## 2021-09-02 NOTE — PROGRESS NOTES
"Chief Complaint  Hypertension    Subjective          Bart Tesfaye presents to [facility]  History of Present Illness  Here for BP f/u- he also has a spot on his lip that he noticed a few weeks ago and it hasn't gone away. No scaling/bleeding  BP avg 140/90  Has stopped drinking and started exercising more over the summer because he wasn't feeling good.  He felt more fatigued, out of shape, has been doing this since May and, unfortunately, hasn't felt much better.  His diet has gotten better since he quit drinking, less late night snacking.        Objective   Vital Signs:   /84   Pulse 76   Temp 97.5 °F (36.4 °C)   Ht 180.3 cm (71\")   Wt 110 kg (243 lb)   BMI 33.89 kg/m²     Physical Exam  Constitutional:       Appearance: Normal appearance.   Cardiovascular:      Rate and Rhythm: Normal rate.   Pulmonary:      Effort: Pulmonary effort is normal.        Result Review :                 Assessment and Plan    Diagnoses and all orders for this visit:    1. Essential hypertension (Primary)  Comments:  will remain on same meds for now and continue to follow  Orders:  -     Comprehensive Metabolic Panel; Future  -     Lipid Panel With / Chol / HDL Ratio; Future    2. Lesion of lip  Comments:  no concerning signs, will ask dentist for his opinion.         Follow Up   Return in about 6 months (around 3/2/2022) for Annual physical, Lab Before FUP.  Patient was given instructions and counseling regarding his condition or for health maintenance advice. Please see specific information pulled into the AVS if appropriate.       "

## 2021-10-27 RX ORDER — AMLODIPINE BESYLATE 10 MG/1
TABLET ORAL
Qty: 90 TABLET | Refills: 0 | Status: SHIPPED | OUTPATIENT
Start: 2021-10-27 | End: 2022-01-20

## 2021-11-24 RX ORDER — HYDROCHLOROTHIAZIDE 25 MG/1
TABLET ORAL
Qty: 90 TABLET | Refills: 2 | Status: SHIPPED | OUTPATIENT
Start: 2021-11-24 | End: 2022-08-29

## 2021-11-29 RX ORDER — LISINOPRIL 20 MG/1
TABLET ORAL
Qty: 135 TABLET | Refills: 4 | Status: SHIPPED | OUTPATIENT
Start: 2021-11-29 | End: 2023-02-28

## 2022-01-20 RX ORDER — AMLODIPINE BESYLATE 10 MG/1
TABLET ORAL
Qty: 90 TABLET | Refills: 0 | Status: SHIPPED | OUTPATIENT
Start: 2022-01-20 | End: 2022-04-21

## 2022-03-03 ENCOUNTER — OFFICE VISIT (OUTPATIENT)
Dept: INTERNAL MEDICINE | Facility: CLINIC | Age: 57
End: 2022-03-03

## 2022-03-03 VITALS
DIASTOLIC BLOOD PRESSURE: 88 MMHG | WEIGHT: 256 LBS | HEIGHT: 71 IN | TEMPERATURE: 97.3 F | BODY MASS INDEX: 35.84 KG/M2 | SYSTOLIC BLOOD PRESSURE: 124 MMHG | HEART RATE: 76 BPM

## 2022-03-03 DIAGNOSIS — Z00.00 PHYSICAL EXAM, ANNUAL: ICD-10-CM

## 2022-03-03 DIAGNOSIS — I10 ESSENTIAL HYPERTENSION: Primary | ICD-10-CM

## 2022-03-03 DIAGNOSIS — E78.2 MIXED HYPERLIPIDEMIA: ICD-10-CM

## 2022-03-03 PROCEDURE — 99396 PREV VISIT EST AGE 40-64: CPT | Performed by: INTERNAL MEDICINE

## 2022-03-03 RX ORDER — MELOXICAM 15 MG/1
15 TABLET ORAL DAILY
COMMUNITY

## 2022-03-03 NOTE — PROGRESS NOTES
Subjective   Bart Tesfaye is a 56 y.o. male and is here for a comprehensive physical exam. The patient reports problems - family stressors.  Lots of stress- father dx Alzheimers, stepmother not doing well with it even though it is early stages.  He tries to be there as much as possible.   He has gained some weight with all of the stressors.   Not watching diet.         Social History:   Social History     Socioeconomic History   • Marital status:    Tobacco Use   • Smoking status: Former Smoker   • Smokeless tobacco: Never Used   Substance and Sexual Activity   • Alcohol use: Yes     Comment: 3-4x per week   • Drug use: Yes     Types: Marijuana     Comment: occasionally   • Sexual activity: Yes     Partners: Female     Birth control/protection: Post-menopausal       Family History:   Family History   Problem Relation Age of Onset   • Hypertension Father    • Dementia Father    • Nephrolithiasis Father    • Colon polyps Sister    • Seizures Sister    • Nephrolithiasis Sister        Past Medical History:   Past Medical History:   Diagnosis Date   • Colon polyps    • Hyperlipidemia    • Hypertension    • Kidney stones        Medications:   Current Outpatient Medications:   •  amLODIPine (NORVASC) 10 MG tablet, TAKE 1 TABLET BY MOUTH EVERY DAY, Disp: 90 tablet, Rfl: 0  •  hydroCHLOROthiazide (HYDRODIURIL) 25 MG tablet, TAKE 1 TABLET BY MOUTH EVERY DAY, Disp: 90 tablet, Rfl: 2  •  lisinopril (PRINIVIL,ZESTRIL) 20 MG tablet, TAKE 1 AND 1/2 TABLET BY MOUTH ONCE DAILY, Disp: 135 tablet, Rfl: 4  •  meloxicam (MOBIC) 15 MG tablet, Take 15 mg by mouth Daily., Disp: , Rfl:   •  tadalafil (Cialis) 10 MG tablet, Take 1 tablet by mouth Daily As Needed for Erectile Dysfunction., Disp: 30 tablet, Rfl: 1    Review of Systems    Review of Systems   Constitutional: Negative for fatigue and unexpected weight gain.   HENT: Negative for dental problem and hearing loss.    Eyes: Negative for visual disturbance.  "  Respiratory: Negative for shortness of breath.    Cardiovascular: Negative for chest pain and leg swelling.   Gastrointestinal: Negative for abdominal pain, blood in stool and indigestion.   Genitourinary: Negative for decreased libido and erectile dysfunction.   Musculoskeletal: Negative for arthralgias.   Neurological: Negative for memory problem.   Psychiatric/Behavioral: Negative for sleep disturbance and depressed mood.        There were no vitals filed for this visit.    Vitals:    03/03/22 0838   BP: 124/88   Pulse: 76   Temp: 97.3 °F (36.3 °C)   Weight: 116 kg (256 lb)   Height: 180.3 cm (71\")     Body mass index is 35.7 kg/m².  Wt Readings from Last 3 Encounters:   03/03/22 116 kg (256 lb)   09/02/21 110 kg (243 lb)   03/02/21 109 kg (241 lb)     Objective     Physical Exam  Constitutional:       General: He is not in acute distress.  Eyes:      Conjunctiva/sclera: Conjunctivae normal.   Neck:      Thyroid: No thyromegaly.   Cardiovascular:      Rate and Rhythm: Normal rate and regular rhythm.      Heart sounds: Normal heart sounds.   Pulmonary:      Effort: Pulmonary effort is normal.      Breath sounds: Normal breath sounds.   Abdominal:      General: Bowel sounds are normal.      Palpations: Abdomen is soft.   Musculoskeletal:         General: No tenderness.   Lymphadenopathy:      Cervical: No cervical adenopathy.   Skin:     General: Skin is warm and dry.   Neurological:      Mental Status: He is alert and oriented to person, place, and time.   Psychiatric:         Behavior: Behavior normal.         Thought Content: Thought content normal.         Judgment: Judgment normal.         Procedures  The 10-year ASCVD risk score ([place] LEACH Jr., et al., 2013) is: 10.8%    Values used to calculate the score:      Age: 56 years      Sex: Male      Is Non- : No      Diabetic: No      Tobacco smoker: No      Systolic Blood Pressure: 124 mmHg      Is BP treated: Yes      HDL Cholesterol: 40 " mg/dL      Total Cholesterol: 243 mg/dL       Assessment/Plan     Diagnoses and all orders for this visit:    1. Essential hypertension (Primary)  Comments:  Controlled, no change.     2. Mixed hyperlipidemia  Comments:  much more elevated with weight gain, etc.  Recommend statin but wants to try to improve on his own. Will recheck in 6 months and reassess.  Orders:  -     Comprehensive Metabolic Panel; Future  -     Lipid Panel With / Chol / HDL Ratio; Future  -     TSH; Future    3. Physical exam, annual  Comments:  frustrated about weight gain,sad about taking care of his dad. He is going to try to get things under control.  Recheck as above,  exam with Dr. Johnson.         Return in about 6 months (around 9/3/2022) for Recheck, Lab Before FUP.

## 2022-04-21 RX ORDER — AMLODIPINE BESYLATE 10 MG/1
TABLET ORAL
Qty: 90 TABLET | Refills: 1 | Status: SHIPPED | OUTPATIENT
Start: 2022-04-21 | End: 2022-10-24

## 2022-08-29 RX ORDER — HYDROCHLOROTHIAZIDE 25 MG/1
TABLET ORAL
Qty: 90 TABLET | Refills: 2 | Status: SHIPPED | OUTPATIENT
Start: 2022-08-29

## 2022-09-07 ENCOUNTER — OFFICE VISIT (OUTPATIENT)
Dept: INTERNAL MEDICINE | Facility: CLINIC | Age: 57
End: 2022-09-07

## 2022-09-07 VITALS
SYSTOLIC BLOOD PRESSURE: 130 MMHG | BODY MASS INDEX: 34.3 KG/M2 | HEIGHT: 71 IN | DIASTOLIC BLOOD PRESSURE: 74 MMHG | WEIGHT: 245 LBS | HEART RATE: 76 BPM | TEMPERATURE: 97.3 F

## 2022-09-07 DIAGNOSIS — I10 ESSENTIAL HYPERTENSION: Primary | ICD-10-CM

## 2022-09-07 DIAGNOSIS — E78.2 MIXED HYPERLIPIDEMIA: ICD-10-CM

## 2022-09-07 PROCEDURE — 99213 OFFICE O/P EST LOW 20 MIN: CPT | Performed by: INTERNAL MEDICINE

## 2022-09-07 NOTE — PROGRESS NOTES
"Chief Complaint  Hypertension    Subjective        Bart Tesfaye presents to [facility]  History of Present Illness Here for f/u- has concentrated on his diet in the last 6 months- much less snacking. Has been working on property at [employer]. Covid early August- still has easy fatigability.      Objective   Vital Signs:  /74   Pulse 76   Temp 97.3 °F (36.3 °C)   Ht 180.3 cm (71\")   Wt 111 kg (245 lb)   BMI 34.17 kg/m²   Estimated body mass index is 34.17 kg/m² as calculated from the following:    Height as of this encounter: 180.3 cm (71\").    Weight as of this encounter: 111 kg (245 lb).          Physical Exam  Constitutional:       Appearance: Normal appearance.   Cardiovascular:      Rate and Rhythm: Normal rate and regular rhythm.   Pulmonary:      Effort: Pulmonary effort is normal.        Result Review :  The following data was reviewed by: Anita Wright MD on 09/07/2022:  Common labs    Common Labsle 2/25/22 2/25/22 8/31/22 8/31/22    0910 0910 0837 0837   Glucose 105 (A)  115 (A)    BUN 13  18    Creatinine 0.89  0.93    eGFR Non  Am 95      eGFR African Am 110      Sodium 138  139    Potassium 4.4  4.3    Chloride 101  102    Calcium 9.7  9.7    Total Protein 7.3  7.3    Albumin 4.7  4.80    Total Bilirubin 0.7  0.7    Alkaline Phosphatase 81  74    AST (SGOT) 27  23    ALT (SGPT) 32  32    Total Cholesterol  243 (A)  187   Triglycerides  171 (A)  149   HDL Cholesterol  40  40   LDL Cholesterol   171 (A)  120 (A)   (A) Abnormal value       Comments are available for some flowsheets but are not being displayed.                     Assessment and Plan   Diagnoses and all orders for this visit:    1. Essential hypertension (Primary)  Comments:  Doing great, no change.     2. Mixed hyperlipidemia  Comments:  much better with better diet- no change.              Follow Up   Return in about 6 months (around 3/7/2023) for Annual physical, Lab Before " FUP.  Patient was given instructions and counseling regarding his condition or for health maintenance advice. Please see specific information pulled into the AVS if appropriate.

## 2022-10-24 RX ORDER — AMLODIPINE BESYLATE 10 MG/1
TABLET ORAL
Qty: 90 TABLET | Refills: 1 | Status: SHIPPED | OUTPATIENT
Start: 2022-10-24

## 2022-12-04 ENCOUNTER — TELEMEDICINE (OUTPATIENT)
Dept: FAMILY MEDICINE CLINIC | Facility: TELEHEALTH | Age: 57
End: 2022-12-04

## 2022-12-04 DIAGNOSIS — J11.1 INFLUENZA: Primary | ICD-10-CM

## 2022-12-04 PROCEDURE — 99213 OFFICE O/P EST LOW 20 MIN: CPT | Performed by: NURSE PRACTITIONER

## 2022-12-04 RX ORDER — DEXTROMETHORPHAN HYDROBROMIDE AND PROMETHAZINE HYDROCHLORIDE 15; 6.25 MG/5ML; MG/5ML
5 SYRUP ORAL 4 TIMES DAILY PRN
Qty: 180 ML | Refills: 0 | Status: SHIPPED | OUTPATIENT
Start: 2022-12-04 | End: 2023-03-13

## 2022-12-04 RX ORDER — OSELTAMIVIR PHOSPHATE 75 MG/1
75 CAPSULE ORAL 2 TIMES DAILY
Qty: 10 CAPSULE | Refills: 0 | Status: SHIPPED | OUTPATIENT
Start: 2022-12-04 | End: 2023-03-13

## 2022-12-04 NOTE — PROGRESS NOTES
CHIEF COMPLAINT  Chief Complaint   Patient presents with   • Influenza     Runny nose, head ache, cough, congestion, body aches.          HPI  Bart Tesfaye is a 57 y.o. male  presents with complaint of having symptoms of the flu for 2 days after his entire family has had the flu. He is having runny nose, cough which is disrupting his sleep, headache, chest congestion and body aches. He is using Dayquil and Nyquil.     Review of Systems   Constitutional: Positive for activity change, appetite change, fatigue and fever.   HENT: Positive for congestion and rhinorrhea.    Respiratory: Positive for cough. Negative for shortness of breath, wheezing and stridor.    Cardiovascular: Negative.    Gastrointestinal: Negative.    Musculoskeletal: Positive for myalgias.   Neurological: Positive for headaches.   Hematological: Negative.    Psychiatric/Behavioral: Negative.        Past Medical History:   Diagnosis Date   • Colon polyps    • Hyperlipidemia    • Hypertension    • Kidney stones        Family History   Problem Relation Age of Onset   • Hypertension Father    • Dementia Father    • Nephrolithiasis Father    • Colon polyps Sister    • Seizures Sister    • Nephrolithiasis Sister        Social History     Socioeconomic History   • Marital status:    Tobacco Use   • Smoking status: Former   • Smokeless tobacco: Never   Substance and Sexual Activity   • Alcohol use: Yes     Comment: 3-4x per week   • Drug use: Yes     Types: Marijuana     Comment: occasionally   • Sexual activity: Yes     Partners: Female     Birth control/protection: Post-menopausal         There were no vitals taken for this visit.    PHYSICAL EXAM  Physical Exam   Constitutional: He is oriented to person, place, and time. He appears well-developed and well-nourished. He does not have a sickly appearance. He does not appear ill. No distress.   HENT:   Head: Normocephalic and atraumatic.   Pulmonary/Chest: Effort normal. No accessory muscle  usage or stridor.  No respiratory distress.  Neurological: He is alert and oriented to person, place, and time.   Vitals reviewed.      Results for orders placed or performed in visit on 08/03/22   Comprehensive Metabolic Panel    Specimen: Blood   Result Value Ref Range    Glucose 115 (H) 65 - 99 mg/dL    BUN 18 6 - 20 mg/dL    Creatinine 0.93 0.76 - 1.27 mg/dL    EGFR Result 95.2 >60.0 mL/min/1.73    BUN/Creatinine Ratio 19.4 7.0 - 25.0    Sodium 139 136 - 145 mmol/L    Potassium 4.3 3.5 - 5.2 mmol/L    Chloride 102 98 - 107 mmol/L    Total CO2 25.7 22.0 - 29.0 mmol/L    Calcium 9.7 8.6 - 10.5 mg/dL    Total Protein 7.3 6.0 - 8.5 g/dL    Albumin 4.80 3.50 - 5.20 g/dL    Globulin 2.5 gm/dL    A/G Ratio 1.9 g/dL    Total Bilirubin 0.7 0.0 - 1.2 mg/dL    Alkaline Phosphatase 74 39 - 117 U/L    AST (SGOT) 23 1 - 40 U/L    ALT (SGPT) 32 1 - 41 U/L   Lipid Panel With / Chol / HDL Ratio    Specimen: Blood   Result Value Ref Range    Total Cholesterol 187 0 - 200 mg/dL    Triglycerides 149 0 - 150 mg/dL    HDL Cholesterol 40 40 - 60 mg/dL    VLDL Cholesterol Mc 27 5 - 40 mg/dL    LDL Chol Calc (NIH) 120 (H) 0 - 100 mg/dL    Chol/HDL Ratio 4.68    TSH    Specimen: Blood   Result Value Ref Range    TSH 0.956 0.270 - 4.200 uIU/mL       Diagnoses and all orders for this visit:    1. Influenza (Primary)  -     oseltamivir (Tamiflu) 75 MG capsule; Take 1 capsule by mouth 2 (Two) Times a Day.  Dispense: 10 capsule; Refill: 0  -     promethazine-dextromethorphan (PROMETHAZINE-DM) 6.25-15 MG/5ML syrup; Take 5 mL by mouth 4 (Four) Times a Day As Needed for Cough.  Dispense: 180 mL; Refill: 0    Advise increasing decaffeinated fluids and rest.   Follow up prn worsening s/s.     The use of a video visit has been reviewed with the patient and verbal informd consent has een obtained. Myself and Bart Tesfaye participated in this visit. The patient is located in [address] [place] [address]. I am located in  [place], [place]. Mychart and Zoom were utilized. I spent 15  minutes in the patient's chart for this visit.           Rupal Venegas, RUBEN  12/04/2022  11:38 EST

## 2023-02-28 RX ORDER — LISINOPRIL 20 MG/1
TABLET ORAL
Qty: 135 TABLET | Refills: 4 | Status: SHIPPED | OUTPATIENT
Start: 2023-02-28

## 2023-03-06 DIAGNOSIS — Z00.00 PHYSICAL EXAM, ANNUAL: Primary | ICD-10-CM

## 2023-03-06 LAB
BASOPHILS # BLD AUTO: 0.06 10*3/MM3 (ref 0–0.2)
BASOPHILS NFR BLD AUTO: 1.1 % (ref 0–1.5)
EOSINOPHIL # BLD AUTO: 0.11 10*3/MM3 (ref 0–0.4)
EOSINOPHIL NFR BLD AUTO: 1.9 % (ref 0.3–6.2)
ERYTHROCYTE [DISTWIDTH] IN BLOOD BY AUTOMATED COUNT: 12.8 % (ref 12.3–15.4)
HCT VFR BLD AUTO: 43.5 % (ref 37.5–51)
HGB BLD-MCNC: 14.7 G/DL (ref 13–17.7)
IMM GRANULOCYTES # BLD AUTO: 0.03 10*3/MM3 (ref 0–0.05)
IMM GRANULOCYTES NFR BLD AUTO: 0.5 % (ref 0–0.5)
LYMPHOCYTES # BLD AUTO: 1.22 10*3/MM3 (ref 0.7–3.1)
LYMPHOCYTES NFR BLD AUTO: 21.4 % (ref 19.6–45.3)
MCH RBC QN AUTO: 31.1 PG (ref 26.6–33)
MCHC RBC AUTO-ENTMCNC: 33.8 G/DL (ref 31.5–35.7)
MCV RBC AUTO: 92.2 FL (ref 79–97)
MONOCYTES # BLD AUTO: 0.79 10*3/MM3 (ref 0.1–0.9)
MONOCYTES NFR BLD AUTO: 13.9 % (ref 5–12)
NEUTROPHILS # BLD AUTO: 3.49 10*3/MM3 (ref 1.7–7)
NEUTROPHILS NFR BLD AUTO: 61.2 % (ref 42.7–76)
NRBC BLD AUTO-RTO: 0 /100 WBC (ref 0–0.2)
PLATELET # BLD AUTO: 260 10*3/MM3 (ref 140–450)
RBC # BLD AUTO: 4.72 10*6/MM3 (ref 4.14–5.8)
WBC # BLD AUTO: 5.7 10*3/MM3 (ref 3.4–10.8)

## 2023-03-13 ENCOUNTER — OFFICE VISIT (OUTPATIENT)
Dept: INTERNAL MEDICINE | Facility: CLINIC | Age: 58
End: 2023-03-13
Payer: COMMERCIAL

## 2023-03-13 VITALS
HEART RATE: 78 BPM | WEIGHT: 249 LBS | BODY MASS INDEX: 34.86 KG/M2 | SYSTOLIC BLOOD PRESSURE: 132 MMHG | DIASTOLIC BLOOD PRESSURE: 74 MMHG | HEIGHT: 71 IN

## 2023-03-13 DIAGNOSIS — G47.9 SLEEP DISTURBANCES: ICD-10-CM

## 2023-03-13 DIAGNOSIS — I10 ESSENTIAL HYPERTENSION: Primary | ICD-10-CM

## 2023-03-13 DIAGNOSIS — M25.512 LEFT SHOULDER PAIN, UNSPECIFIED CHRONICITY: ICD-10-CM

## 2023-03-13 DIAGNOSIS — E78.2 MIXED HYPERLIPIDEMIA: ICD-10-CM

## 2023-03-13 DIAGNOSIS — Z00.00 PHYSICAL EXAM, ANNUAL: ICD-10-CM

## 2023-03-13 PROCEDURE — 99396 PREV VISIT EST AGE 40-64: CPT | Performed by: INTERNAL MEDICINE

## 2023-03-13 PROCEDURE — 99214 OFFICE O/P EST MOD 30 MIN: CPT | Performed by: INTERNAL MEDICINE

## 2023-03-13 RX ORDER — TRAZODONE HYDROCHLORIDE 50 MG/1
TABLET ORAL
Qty: 180 TABLET | Refills: 1 | Status: SHIPPED | OUTPATIENT
Start: 2023-03-13

## 2023-03-13 NOTE — PROGRESS NOTES
Subjective   Bart Tesfaye is a 57 y.o. male and is here for a comprehensive physical exam. The patient reports problems - joint aches.    Has been working on a cabin that they bought at [facility]. He is very sore, tired but loves it. Having a lot of L shoulder pain after doing floor tiles.   He has been having trouble sleeping. Years of not being able to get to sleep but worse lately.   Flu this winter, did ok.     Social History:   Social History     Socioeconomic History   • Marital status:    Tobacco Use   • Smoking status: Former   • Smokeless tobacco: Never   Substance and Sexual Activity   • Alcohol use: Yes     Comment: 3-4x per week   • Drug use: Yes     Types: Marijuana     Comment: occasionally   • Sexual activity: Yes     Partners: Female     Birth control/protection: Post-menopausal       Family History:   Family History   Problem Relation Age of Onset   • Hypertension Father    • Dementia Father    • Nephrolithiasis Father    • Colon polyps Sister    • Seizures Sister    • Nephrolithiasis Sister        Past Medical History:   Past Medical History:   Diagnosis Date   • Colon polyps    • Hyperlipidemia    • Hypertension    • Kidney stones        Medications:   Current Outpatient Medications:   •  amLODIPine (NORVASC) 10 MG tablet, TAKE 1 TABLET BY MOUTH EVERY DAY, Disp: 90 tablet, Rfl: 1  •  hydroCHLOROthiazide (HYDRODIURIL) 25 MG tablet, TAKE 1 TABLET BY MOUTH EVERY DAY, Disp: 90 tablet, Rfl: 2  •  lisinopril (PRINIVIL,ZESTRIL) 20 MG tablet, TAKE 1 AND 1/2 TABLETS BY MOUTH ONCE DAILY, Disp: 135 tablet, Rfl: 4  •  tadalafil (Cialis) 10 MG tablet, Take 1 tablet by mouth Daily As Needed for Erectile Dysfunction., Disp: 30 tablet, Rfl: 1  •  meloxicam (MOBIC) 15 MG tablet, Take 15 mg by mouth Daily., Disp: , Rfl:   •  traZODone (DESYREL) 50 MG tablet, 1-2 tablets po qhs prn insomnia, Disp: 180 tablet, Rfl: 1    Review of Systems    Review of Systems   Constitutional: Negative for fatigue and  "unexpected weight gain.   HENT: Negative for dental problem and hearing loss.    Eyes: Negative for visual disturbance.   Respiratory: Negative for shortness of breath.    Cardiovascular: Negative for chest pain and leg swelling.   Gastrointestinal: Negative for abdominal pain, blood in stool and indigestion.   Endocrine: Negative for polyuria.   Genitourinary: Negative for decreased libido and erectile dysfunction. Difficulty urinating: had some kidney stone pain a couple of weeks ago but it passed.   Musculoskeletal: Negative for arthralgias.        L shoulder, pain with movement   Neurological: Negative for memory problem.   Psychiatric/Behavioral: Negative for sleep disturbance and depressed mood.        There were no vitals filed for this visit.    Vitals:    03/13/23 0846   BP: 132/74   Pulse: 78   Weight: 113 kg (249 lb)   Height: 180.3 cm (71\")     Body mass index is 34.73 kg/m².  Wt Readings from Last 3 Encounters:   03/13/23 113 kg (249 lb)   09/07/22 111 kg (245 lb)   03/03/22 116 kg (256 lb)     Objective     Physical Exam  Constitutional:       General: He is not in acute distress.  Eyes:      Conjunctiva/sclera: Conjunctivae normal.   Neck:      Thyroid: No thyromegaly.   Cardiovascular:      Rate and Rhythm: Normal rate and regular rhythm.      Heart sounds: Normal heart sounds.   Pulmonary:      Effort: Pulmonary effort is normal.      Breath sounds: Normal breath sounds.   Abdominal:      General: Bowel sounds are normal.      Palpations: Abdomen is soft.   Musculoskeletal:         General: No tenderness.      Right shoulder: Normal.      Left shoulder: No swelling, tenderness, bony tenderness or crepitus. Normal range of motion.   Lymphadenopathy:      Cervical: No cervical adenopathy.   Skin:     General: Skin is warm and dry.   Neurological:      Mental Status: He is alert and oriented to person, place, and time.   Psychiatric:         Behavior: Behavior normal.         Thought Content: Thought " content normal.         Judgment: Judgment normal.         Procedures       Assessment & Plan     Diagnoses and all orders for this visit:    1. Essential hypertension (Primary)  Comments:  controlled, no change.     2. Mixed hyperlipidemia  Comments:  labs not drawn correctly- no change in therapy, work on diet.  Recheck 6 m    3. Sleep disturbances  Comments:  will go back to trazodone  mg nightly  Orders:  -     traZODone (DESYREL) 50 MG tablet; 1-2 tablets po qhs prn insomnia  Dispense: 180 tablet; Refill: 1    4. Left shoulder pain, unspecified chronicity  Comments:  reassuring that there is no reproducible pain today- likely due to overuse- watch and eval as indicated.     5. Physical exam, annual  Comments:  exam benign- discussed weight!  shingrix encouraged at pharmacy. Recheck 6 m.         Return in about 6 months (around 9/13/2023) for Recheck, Lab Before FUP.

## 2023-04-24 RX ORDER — AMLODIPINE BESYLATE 10 MG/1
TABLET ORAL
Qty: 90 TABLET | Refills: 1 | Status: SHIPPED | OUTPATIENT
Start: 2023-04-24

## 2023-05-17 DIAGNOSIS — Z12.11 SCREEN FOR COLON CANCER: Primary | ICD-10-CM

## 2023-06-01 RX ORDER — HYDROCHLOROTHIAZIDE 25 MG/1
TABLET ORAL
Qty: 90 TABLET | Refills: 2 | Status: SHIPPED | OUTPATIENT
Start: 2023-06-01

## 2023-07-13 PROBLEM — Z12.11 ENCOUNTER FOR SCREENING FOR MALIGNANT NEOPLASM OF COLON: Status: ACTIVE | Noted: 2023-07-13

## 2023-08-23 RX ORDER — TADALAFIL 10 MG/1
TABLET ORAL
Qty: 10 TABLET | Refills: 1 | Status: SHIPPED | OUTPATIENT
Start: 2023-08-23

## 2023-08-24 DIAGNOSIS — G47.9 SLEEP DISTURBANCES: ICD-10-CM

## 2023-08-24 RX ORDER — TRAZODONE HYDROCHLORIDE 50 MG/1
TABLET ORAL
Qty: 180 TABLET | Refills: 1 | Status: SHIPPED | OUTPATIENT
Start: 2023-08-24

## 2023-09-26 ENCOUNTER — HOSPITAL ENCOUNTER (OUTPATIENT)
Facility: SURGERY CENTER | Age: 58
Setting detail: HOSPITAL OUTPATIENT SURGERY
Discharge: HOME | End: 2023-09-26
Attending: INTERNAL MEDICINE | Admitting: INTERNAL MEDICINE
Payer: COMMERCIAL

## 2023-09-26 ENCOUNTER — ANESTHESIA (OUTPATIENT)
Dept: SURGERY | Facility: SURGERY CENTER | Age: 58
End: 2023-09-26
Payer: COMMERCIAL

## 2023-09-26 ENCOUNTER — ANESTHESIA EVENT (OUTPATIENT)
Dept: SURGERY | Facility: SURGERY CENTER | Age: 58
End: 2023-09-26
Payer: COMMERCIAL

## 2023-09-26 VITALS
BODY MASS INDEX: 33.65 KG/M2 | SYSTOLIC BLOOD PRESSURE: 111 MMHG | TEMPERATURE: 97.8 F | DIASTOLIC BLOOD PRESSURE: 85 MMHG | OXYGEN SATURATION: 97 % | WEIGHT: 240.4 LBS | HEIGHT: 71 IN | RESPIRATION RATE: 16 BRPM | HEART RATE: 65 BPM

## 2023-09-26 DIAGNOSIS — Z12.11 ENCOUNTER FOR SCREENING FOR MALIGNANT NEOPLASM OF COLON: ICD-10-CM

## 2023-09-26 PROCEDURE — 45380 COLONOSCOPY AND BIOPSY: CPT | Performed by: INTERNAL MEDICINE

## 2023-09-26 PROCEDURE — 45385 COLONOSCOPY W/LESION REMOVAL: CPT | Performed by: INTERNAL MEDICINE

## 2023-09-26 PROCEDURE — 25010000002 PROPOFOL 10 MG/ML EMULSION: Performed by: STUDENT IN AN ORGANIZED HEALTH CARE EDUCATION/TRAINING PROGRAM

## 2023-09-26 PROCEDURE — 88305 TISSUE EXAM BY PATHOLOGIST: CPT | Performed by: INTERNAL MEDICINE

## 2023-09-26 RX ORDER — SODIUM CHLORIDE, SODIUM LACTATE, POTASSIUM CHLORIDE, CALCIUM CHLORIDE 600; 310; 30; 20 MG/100ML; MG/100ML; MG/100ML; MG/100ML
30 INJECTION, SOLUTION INTRAVENOUS CONTINUOUS PRN
Status: DISCONTINUED | OUTPATIENT
Start: 2023-09-26 | End: 2023-09-26 | Stop reason: HOSPADM

## 2023-09-26 RX ORDER — LIDOCAINE HYDROCHLORIDE 20 MG/ML
INJECTION, SOLUTION INFILTRATION; PERINEURAL AS NEEDED
Status: DISCONTINUED | OUTPATIENT
Start: 2023-09-26 | End: 2023-09-26 | Stop reason: SURG

## 2023-09-26 RX ORDER — SODIUM CHLORIDE 0.9 % (FLUSH) 0.9 %
3 SYRINGE (ML) INJECTION EVERY 12 HOURS SCHEDULED
Status: DISCONTINUED | OUTPATIENT
Start: 2023-09-26 | End: 2023-09-26 | Stop reason: HOSPADM

## 2023-09-26 RX ORDER — MAGNESIUM HYDROXIDE 1200 MG/15ML
LIQUID ORAL AS NEEDED
Status: DISCONTINUED | OUTPATIENT
Start: 2023-09-26 | End: 2023-09-26 | Stop reason: HOSPADM

## 2023-09-26 RX ORDER — PROPOFOL 10 MG/ML
VIAL (ML) INTRAVENOUS AS NEEDED
Status: DISCONTINUED | OUTPATIENT
Start: 2023-09-26 | End: 2023-09-26 | Stop reason: SURG

## 2023-09-26 RX ORDER — SODIUM CHLORIDE, SODIUM LACTATE, POTASSIUM CHLORIDE, CALCIUM CHLORIDE 600; 310; 30; 20 MG/100ML; MG/100ML; MG/100ML; MG/100ML
INJECTION, SOLUTION INTRAVENOUS CONTINUOUS PRN
Status: DISCONTINUED | OUTPATIENT
Start: 2023-09-26 | End: 2023-09-26 | Stop reason: SURG

## 2023-09-26 RX ORDER — PROPOFOL 10 MG/ML
VIAL (ML) INTRAVENOUS CONTINUOUS PRN
Status: DISCONTINUED | OUTPATIENT
Start: 2023-09-26 | End: 2023-09-26 | Stop reason: SURG

## 2023-09-26 RX ORDER — SODIUM CHLORIDE 0.9 % (FLUSH) 0.9 %
10 SYRINGE (ML) INJECTION AS NEEDED
Status: DISCONTINUED | OUTPATIENT
Start: 2023-09-26 | End: 2023-09-26 | Stop reason: HOSPADM

## 2023-09-26 RX ADMIN — SODIUM CHLORIDE, SODIUM LACTATE, POTASSIUM CHLORIDE, AND CALCIUM CHLORIDE: .6; .31; .03; .02 INJECTION, SOLUTION INTRAVENOUS at 12:05

## 2023-09-26 RX ADMIN — SODIUM CHLORIDE, POTASSIUM CHLORIDE, SODIUM LACTATE AND CALCIUM CHLORIDE 30 ML/HR: 600; 310; 30; 20 INJECTION, SOLUTION INTRAVENOUS at 10:42

## 2023-09-26 RX ADMIN — Medication 300 MCG/KG/MIN: at 12:08

## 2023-09-26 RX ADMIN — LIDOCAINE HYDROCHLORIDE 20 MG: 20 INJECTION, SOLUTION INFILTRATION; PERINEURAL at 12:08

## 2023-09-26 RX ADMIN — PROPOFOL 50 MG: 10 INJECTION, EMULSION INTRAVENOUS at 12:08

## 2023-09-26 NOTE — ANESTHESIA POSTPROCEDURE EVALUATION
Patient: Bart Tesfaye    Procedure Summary       Date: 09/26/23 Room / Location: [facility]    Anesthesia Start: 1205 Anesthesia Stop: 1229    Procedure: COLONOSCOPY TO CECUM WITH POLYPECTOMY Diagnosis:       Encounter for screening for malignant neoplasm of colon      (Encounter for screening for malignant neoplasm of colon [Z12.11])    Surgeons: Rashel Lamar MD Provider: Shon Cates MD    Anesthesia Type: MAC ASA Status: 3            Anesthesia Type: MAC    Vitals  Vitals Value Taken Time   BP 98/68 09/26/23 1229   Temp 36.6 °C (97.8 °F) 09/26/23 1229   Pulse 67 09/26/23 1229   Resp 16 09/26/23 1229   SpO2 95 % 09/26/23 1229           Post Anesthesia Care and Evaluation    Patient location during evaluation: bedside  Patient participation: complete - patient cannot participate  Level of consciousness: sleepy but conscious  Pain management: adequate    Airway patency: patent  Anesthetic complications: No anesthetic complications  PONV Status: controlled  Cardiovascular status: acceptable  Respiratory status: acceptable  Hydration status: acceptable

## 2023-09-26 NOTE — H&P
No chief complaint on file.      HPI  Patient today for a colonoscopy for screening.  He has a history of diverticulosis and colon polyps.         Problem List:    Patient Active Problem List   Diagnosis    Essential hypertension    Kidney stones    Mixed hyperlipidemia    Encounter for screening for malignant neoplasm of colon       Medical History:    Past Medical History:   Diagnosis Date    Colon polyps     Hyperlipidemia     Hypertension     Kidney stones         Social History:    Social History     Socioeconomic History    Marital status:    Tobacco Use    Smoking status: Former    Smokeless tobacco: Never   Substance and Sexual Activity    Alcohol use: Yes     Comment: 3-4x per week    Drug use: Yes     Types: Marijuana     Comment: occasionally    Sexual activity: Yes     Partners: Female     Birth control/protection: Post-menopausal       Family History:   Family History   Problem Relation Age of Onset    Hypertension Father     Dementia Father     Nephrolithiasis Father     Colon polyps Sister     Seizures Sister     Nephrolithiasis Sister        Surgical History:   Past Surgical History:   Procedure Laterality Date    CHOLECYSTECTOMY N/A 2015    COLONOSCOPY N/A 2019    [facility]    CYST REMOVAL  2017    back - Dr. Schulte    CYSTOSCOPY BLADDER STONE LITHOTRIPSY      OTHER SURGICAL HISTORY  2015    broken arm    RETINAL DETACHMENT REPAIR Right 06/09/2016    RETINAL DETACHMENT REPAIR Left        No current facility-administered medications for this encounter.    Allergies: No Known Allergies     The following portions of the patient's history were reviewed by me and updated as appropriate: review of systems, allergies, current medications, past family history, past medical history, past social history, past surgical history and problem list.    Vitals:    09/26/23 1033   BP: 135/97   Pulse: 69   Resp: 18   Temp: 96.9 °F (36.1 °C)   SpO2: 98%       PHYSICAL EXAM:    CONSTITUTIONAL:  today's vital  signs reviewed by me  GASTROINTESTINAL: abdomen is soft nontender nondistended with normal active bowel sounds, no masses are appreciated    Assessment/ Plan  We will proceed today with screening colonoscopy.    Risks and benefits as well as alternatives to endoscopic evaluation were explained to the patient and they voiced understanding and wish to proceed.  These risks include but are not limited to the risk of bleeding, perforation, adverse reaction to sedation, and missed lesions.  The patient was given the opportunity to ask questions prior to the endoscopic procedure.

## 2023-09-26 NOTE — ANESTHESIA PREPROCEDURE EVALUATION
Anesthesia Evaluation     Patient summary reviewed and Nursing notes reviewed   no history of anesthetic complications:   NPO Solid Status: > 8 hours  NPO Liquid Status: > 2 hours           Airway   Mallampati: II  TM distance: >3 FB  Neck ROM: full  Dental      Comment: Denies any chipped, cracked, or missing teeth     Pulmonary - normal exam   (+) a smoker (quit in 1990s, 1ppd for 15 years) Former,  (-) COPD, asthma  Cardiovascular - normal exam    (+) hypertension, hyperlipidemia      Neuro/Psych  GI/Hepatic/Renal/Endo    (+) renal disease stones  (-) liver disease, diabetes, no thyroid disorder    Musculoskeletal     Abdominal    Substance History      OB/GYN          Other                      Anesthesia Plan    ASA 3     MAC       Anesthetic plan, risks, benefits, and alternatives have been provided, discussed and informed consent has been obtained with: patient.    CODE STATUS:

## 2023-09-27 LAB
LAB AP CASE REPORT: NORMAL
LAB AP CLINICAL INFORMATION: NORMAL
PATH REPORT.FINAL DX SPEC: NORMAL
PATH REPORT.GROSS SPEC: NORMAL

## 2023-10-24 RX ORDER — AMLODIPINE BESYLATE 10 MG/1
TABLET ORAL
Qty: 90 TABLET | Refills: 1 | Status: SHIPPED | OUTPATIENT
Start: 2023-10-24

## 2023-10-26 ENCOUNTER — OFFICE VISIT (OUTPATIENT)
Dept: INTERNAL MEDICINE | Facility: CLINIC | Age: 58
End: 2023-10-26
Payer: COMMERCIAL

## 2023-10-26 VITALS
DIASTOLIC BLOOD PRESSURE: 96 MMHG | HEART RATE: 78 BPM | BODY MASS INDEX: 33.32 KG/M2 | WEIGHT: 238 LBS | HEIGHT: 71 IN | SYSTOLIC BLOOD PRESSURE: 136 MMHG

## 2023-10-26 DIAGNOSIS — E78.2 MIXED HYPERLIPIDEMIA: Chronic | ICD-10-CM

## 2023-10-26 DIAGNOSIS — K76.0 HEPATIC STEATOSIS: Primary | ICD-10-CM

## 2023-10-26 DIAGNOSIS — Z23 NEED FOR INFLUENZA VACCINATION: ICD-10-CM

## 2023-10-26 DIAGNOSIS — I10 ESSENTIAL HYPERTENSION: Chronic | ICD-10-CM

## 2023-10-26 RX ORDER — LISINOPRIL 40 MG/1
40 TABLET ORAL DAILY
Qty: 90 TABLET | Refills: 1 | Status: SHIPPED | OUTPATIENT
Start: 2023-10-26

## 2023-10-26 RX ORDER — ATORVASTATIN CALCIUM 20 MG/1
20 TABLET, FILM COATED ORAL DAILY
Qty: 90 TABLET | Refills: 1 | Status: SHIPPED | OUTPATIENT
Start: 2023-10-26

## 2023-10-26 NOTE — PROGRESS NOTES
"Chief Complaint  Hypertension    Subjective        Bart Tesfaye presents to [facility]  Hypertension      Here for f/u- had c-scope with more tubular adenomas.   Also had f/u CT at urology- some findings to review- atherosclerotic disease and hepatic steatosis. Quit smoking 15 years ago.   Admits to drinking too much alcohol.   Thinks he may have gotten Hep B years ago as a student.   BP readings have been > 140/90.    Objective   Vital Signs:  /96   Pulse 78   Ht 180.3 cm (70.98\")   Wt 108 kg (238 lb)   BMI 33.21 kg/m²   Estimated body mass index is 33.21 kg/m² as calculated from the following:    Height as of this encounter: 180.3 cm (70.98\").    Weight as of this encounter: 108 kg (238 lb).       BMI is >= 30 and <35. (Class 1 Obesity). The following options were offered after discussion;: exercise counseling/recommendations and nutrition counseling/recommendations      Physical Exam  Constitutional:       Appearance: Normal appearance.   Eyes:      General: No scleral icterus.  Cardiovascular:      Rate and Rhythm: Normal rate.        Result Review :  The following data was reviewed by: Anita Wright MD on 10/26/2023:  Common labs          3/6/2023    09:41 9/20/2023    08:42   Common Labs   Glucose  110    BUN  12    Creatinine  0.94    Sodium  139    Potassium  4.7    Chloride  105    Calcium  10.1    Total Protein  7.0    Albumin  4.6    Total Bilirubin  0.4    Alkaline Phosphatase  72    AST (SGOT)  15    ALT (SGPT)  15    WBC 5.70     Hemoglobin 14.7     Hematocrit 43.5     Platelets 260     Total Cholesterol  187    Triglycerides  171    HDL Cholesterol  40    LDL Cholesterol   117                   Assessment and Plan   Diagnoses and all orders for this visit:    1. Hepatic steatosis (Primary)  Comments:  likely diet and alcohol driven. Long discussion about cessation. To discuss with family.    2. Essential hypertension  Comments:  Increase lisinopril 40 " mg daily- monitor BP  Orders:  -     lisinopril (PRINIVIL,ZESTRIL) 40 MG tablet; Take 1 tablet by mouth Daily.  Dispense: 90 tablet; Refill: 1    3. Mixed hyperlipidemia  Comments:  add atorvastatin 20 mg.  Orders:  -     atorvastatin (LIPITOR) 20 MG tablet; Take 1 tablet by mouth Daily.  Dispense: 90 tablet; Refill: 1  -     Comprehensive Metabolic Panel; Future  -     Lipid Panel With / Chol / HDL Ratio; Future    4. Need for influenza vaccination  -     Fluzone (or Fluarix & Flulaval for VFC) >6mos             Follow Up   Return in about 3 months (around 1/26/2024) for Recheck, Lab Before FUP.  Patient was given instructions and counseling regarding his condition or for health maintenance advice. Please see specific information pulled into the AVS if appropriate.

## 2024-01-26 RX ORDER — HYDROCHLOROTHIAZIDE 25 MG/1
TABLET ORAL
Qty: 90 TABLET | Refills: 2 | Status: SHIPPED | OUTPATIENT
Start: 2024-01-26

## 2024-01-29 ENCOUNTER — OFFICE VISIT (OUTPATIENT)
Dept: INTERNAL MEDICINE | Facility: CLINIC | Age: 59
End: 2024-01-29
Payer: COMMERCIAL

## 2024-01-29 VITALS
WEIGHT: 239 LBS | HEART RATE: 76 BPM | DIASTOLIC BLOOD PRESSURE: 78 MMHG | HEIGHT: 71 IN | BODY MASS INDEX: 33.46 KG/M2 | SYSTOLIC BLOOD PRESSURE: 130 MMHG

## 2024-01-29 DIAGNOSIS — R73.9 ELEVATED BLOOD SUGAR: ICD-10-CM

## 2024-01-29 DIAGNOSIS — I10 ESSENTIAL HYPERTENSION: Primary | Chronic | ICD-10-CM

## 2024-01-29 DIAGNOSIS — E78.2 MIXED HYPERLIPIDEMIA: Chronic | ICD-10-CM

## 2024-01-29 PROCEDURE — 99213 OFFICE O/P EST LOW 20 MIN: CPT | Performed by: INTERNAL MEDICINE

## 2024-01-29 NOTE — PROGRESS NOTES
"Chief Complaint  Hypertension    Subjective        Bart Tesfaye presents to [facility]  History of Present Illness here for reg f/u- he has cut back on his eating- frustrated that the weight isn't better. Has really cut back on the drinking- only 4x in the past 6 months. Not going to the gym but remains active.       Objective   Vital Signs:  /78   Pulse 76   Ht 180.3 cm (70.98\")   Wt 108 kg (239 lb)   BMI 33.35 kg/m²   Estimated body mass index is 33.35 kg/m² as calculated from the following:    Height as of this encounter: 180.3 cm (70.98\").    Weight as of this encounter: 108 kg (239 lb).               Physical Exam  Constitutional:       Appearance: Normal appearance.   Cardiovascular:      Rate and Rhythm: Normal rate and regular rhythm.   Musculoskeletal:      Right lower leg: No edema.      Left lower leg: No edema.        Result Review :    The following data was reviewed by: Anita Wright MD on 01/29/2024:  Common labs          3/6/2023    09:41 9/20/2023    08:42 1/22/2024    12:42   Common Labs   Glucose  110  103    BUN  12  15    Creatinine  0.94  0.95    Sodium  139  143    Potassium  4.7  3.9    Chloride  105  106    Calcium  10.1  9.9    Total Protein  7.0  7.1    Albumin  4.6  4.9    Total Bilirubin  0.4  0.7    Alkaline Phosphatase  72  90    AST (SGOT)  15  24    ALT (SGPT)  15  38    WBC 5.70      Hemoglobin 14.7      Hematocrit 43.5      Platelets 260      Total Cholesterol  187  166    Triglycerides  171  124    HDL Cholesterol  40  52    LDL Cholesterol   117  92                   Assessment and Plan     Diagnoses and all orders for this visit:    1. Essential hypertension (Primary)  Comments:  controlled, no change.    2. Mixed hyperlipidemia  Comments:  much improved with lifestyle changes- cont same.    3. Elevated blood sugar             Follow Up     Return in about 6 months (around 7/29/2024) for Annual physical, Lab Before FUP.  Patient " was given instructions and counseling regarding his condition or for health maintenance advice. Please see specific information pulled into the AVS if appropriate.

## 2024-02-19 DIAGNOSIS — G47.9 SLEEP DISTURBANCES: ICD-10-CM

## 2024-02-19 RX ORDER — TRAZODONE HYDROCHLORIDE 50 MG/1
TABLET ORAL
Qty: 180 TABLET | Refills: 1 | Status: SHIPPED | OUTPATIENT
Start: 2024-02-19

## 2024-04-21 DIAGNOSIS — I10 ESSENTIAL HYPERTENSION: Chronic | ICD-10-CM

## 2024-04-21 DIAGNOSIS — E78.2 MIXED HYPERLIPIDEMIA: Chronic | ICD-10-CM

## 2024-04-22 RX ORDER — AMLODIPINE BESYLATE 10 MG/1
TABLET ORAL
Qty: 90 TABLET | Refills: 1 | Status: SHIPPED | OUTPATIENT
Start: 2024-04-22

## 2024-04-22 RX ORDER — LISINOPRIL 40 MG/1
40 TABLET ORAL DAILY
Qty: 90 TABLET | Refills: 1 | Status: SHIPPED | OUTPATIENT
Start: 2024-04-22

## 2024-04-22 RX ORDER — ATORVASTATIN CALCIUM 20 MG/1
20 TABLET, FILM COATED ORAL DAILY
Qty: 90 TABLET | Refills: 1 | Status: SHIPPED | OUTPATIENT
Start: 2024-04-22

## 2024-07-30 ENCOUNTER — OFFICE VISIT (OUTPATIENT)
Dept: INTERNAL MEDICINE | Facility: CLINIC | Age: 59
End: 2024-07-30
Payer: COMMERCIAL

## 2024-07-30 VITALS
BODY MASS INDEX: 33.8 KG/M2 | WEIGHT: 241.4 LBS | DIASTOLIC BLOOD PRESSURE: 84 MMHG | SYSTOLIC BLOOD PRESSURE: 130 MMHG | HEART RATE: 72 BPM | HEIGHT: 71 IN | TEMPERATURE: 98.6 F

## 2024-07-30 DIAGNOSIS — E78.2 MIXED HYPERLIPIDEMIA: Chronic | ICD-10-CM

## 2024-07-30 DIAGNOSIS — Z23 NEED FOR SHINGLES VACCINE: Primary | ICD-10-CM

## 2024-07-30 DIAGNOSIS — K76.0 HEPATIC STEATOSIS: Chronic | ICD-10-CM

## 2024-07-30 DIAGNOSIS — I10 ESSENTIAL HYPERTENSION: Chronic | ICD-10-CM

## 2024-07-30 DIAGNOSIS — Z00.00 PHYSICAL EXAM, ANNUAL: ICD-10-CM

## 2024-07-30 PROCEDURE — 99396 PREV VISIT EST AGE 40-64: CPT | Performed by: INTERNAL MEDICINE

## 2024-07-30 PROCEDURE — 90750 HZV VACC RECOMBINANT IM: CPT | Performed by: INTERNAL MEDICINE

## 2024-07-30 PROCEDURE — 90471 IMMUNIZATION ADMIN: CPT | Performed by: INTERNAL MEDICINE

## 2024-07-30 RX ORDER — DORZOLAMIDE HYDROCHLORIDE AND TIMOLOL MALEATE 20; 5 MG/ML; MG/ML
1 SOLUTION/ DROPS OPHTHALMIC 2 TIMES DAILY
COMMUNITY
Start: 2024-06-15

## 2024-07-30 NOTE — PROGRESS NOTES
Subjective   Bart Tesfaye is a 59 y.o. male and is here for a comprehensive physical exam. The patient reports problems - stress . Has a lot of stress with aging parents, etc.   Hasn't been doing well with taking care of himself.  He has decreased his alcohol intake to only 1-2 on weekends.       Social History:   Social History     Socioeconomic History    Marital status:    Tobacco Use    Smoking status: Former     Passive exposure: Past    Smokeless tobacco: Never   Vaping Use    Vaping status: Never Used   Substance and Sexual Activity    Alcohol use: Yes     Comment: 3-4x per week    Drug use: Not Currently     Types: Marijuana     Comment: occasionally    Sexual activity: Yes     Partners: Female     Birth control/protection: Post-menopausal       Family History:   Family History   Problem Relation Age of Onset    Hypertension Father     Dementia Father     Nephrolithiasis Father     Colon polyps Sister     Seizures Sister     Nephrolithiasis Sister        Past Medical History:   Past Medical History:   Diagnosis Date    Colon polyps     Hyperlipidemia     Hypertension     Kidney stones        Medications:   Current Outpatient Medications:     amLODIPine (NORVASC) 10 MG tablet, TAKE 1 TABLET BY MOUTH EVERY DAY, Disp: 90 tablet, Rfl: 1    atorvastatin (LIPITOR) 20 MG tablet, TAKE 1 TABLET BY MOUTH EVERY DAY, Disp: 90 tablet, Rfl: 1    dorzolamide-timolol (COSOPT) 2-0.5 % ophthalmic solution, Administer 1 drop to both eyes 2 (Two) Times a Day., Disp: , Rfl:     hydroCHLOROthiazide (HYDRODIURIL) 25 MG tablet, TAKE 1 TABLET BY MOUTH EVERY DAY, Disp: 90 tablet, Rfl: 2    lisinopril (PRINIVIL,ZESTRIL) 40 MG tablet, TAKE 1 TABLET BY MOUTH EVERY DAY, Disp: 90 tablet, Rfl: 1    tadalafil (CIALIS) 10 MG tablet, TAKE ONE TABLET BY MOUTH NO SOONER THAN EVERY 24 HOURS IF NEEDED, Disp: 10 tablet, Rfl: 1    traZODone (DESYREL) 50 MG tablet, TAKE 1-2 TABLETS BY MOUTH AT BEDTIME AS NEEDED INSOMNIA., Disp: 180  "tablet, Rfl: 1    Review of Systems    Review of Systems     There were no vitals filed for this visit.    Vitals:    07/30/24 1009   BP: 130/84   Pulse: 72   Temp: 98.6 °F (37 °C)   TempSrc: Temporal   Weight: 109 kg (241 lb 6.4 oz)   Height: 180.3 cm (70.98\")     Body mass index is 33.69 kg/m².  Wt Readings from Last 3 Encounters:   07/30/24 109 kg (241 lb 6.4 oz)   01/29/24 108 kg (239 lb)   10/26/23 108 kg (238 lb)     Objective     Physical Exam  Constitutional:       General: He is not in acute distress.  HENT:      Mouth/Throat:      Mouth: Mucous membranes are moist.   Eyes:      Conjunctiva/sclera: Conjunctivae normal.   Neck:      Thyroid: No thyromegaly.   Cardiovascular:      Rate and Rhythm: Normal rate and regular rhythm.      Heart sounds: Normal heart sounds.   Pulmonary:      Effort: Pulmonary effort is normal.      Breath sounds: Normal breath sounds.   Abdominal:      General: Bowel sounds are normal. There is no distension.      Palpations: Abdomen is soft.      Tenderness: There is no abdominal tenderness.   Musculoskeletal:         General: No tenderness.      Right lower leg: No edema.      Left lower leg: No edema.   Lymphadenopathy:      Cervical: No cervical adenopathy.   Skin:     General: Skin is warm and dry.   Neurological:      Mental Status: He is alert and oriented to person, place, and time.   Psychiatric:         Behavior: Behavior normal.         Thought Content: Thought content normal.         Judgment: Judgment normal.       Procedures  CMP          9/20/2023    08:42 1/22/2024    12:42 7/23/2024    09:54   CMP   Glucose 110  103  121    BUN 12  15  12    Creatinine 0.94  0.95  0.93    Sodium 139  143  136    Potassium 4.7  3.9  4.7    Chloride 105  106  100    Calcium 10.1  9.9  9.9    Total Protein 7.0  7.1  6.9    Albumin 4.6  4.9  4.4    Globulin 2.4  2.2  2.5    Total Bilirubin 0.4  0.7  0.4    Alkaline Phosphatase 72  90  87    AST (SGOT) 15  24  19    ALT (SGPT) 15  38  " 20    BUN/Creatinine Ratio 12.8  15.8  12.9        Lipid Panel          9/20/2023    08:42 1/22/2024    12:42 7/23/2024    09:54   Lipid Panel   Total Cholesterol 187  166  139    Triglycerides 171  124  155    HDL Cholesterol 40  52  45    VLDL Cholesterol 30  22  27    LDL Cholesterol  117  92  67    Computed FIB-4 Calculation unavailable. One or more values for this score either were not found within the given timeframe or did not fit some other criterion.            Assessment & Plan     Diagnoses and all orders for this visit:    1. Need for shingles vaccine (Primary)  -     Shingrix Vaccine    2. Hepatic steatosis  Comments:  cont working on weight and less alcohol-    3. Essential hypertension  Comments:  controlled, no change.    4. Mixed hyperlipidemia  Comments:  reviewed- at goal with atorvastatin    5. Physical exam, annual  Comments:  Cont working on better health- exercise and diet stressed- Vaccines reviewed- to get second Shingrix 2-6 m        Return in about 6 months (around 1/30/2025) for Recheck.

## 2024-09-06 DIAGNOSIS — E78.2 MIXED HYPERLIPIDEMIA: ICD-10-CM

## 2024-09-06 DIAGNOSIS — R73.9 ELEVATED BLOOD SUGAR: Primary | ICD-10-CM

## 2024-10-18 DIAGNOSIS — I10 ESSENTIAL HYPERTENSION: Chronic | ICD-10-CM

## 2024-10-18 DIAGNOSIS — E78.2 MIXED HYPERLIPIDEMIA: Chronic | ICD-10-CM

## 2024-10-18 RX ORDER — AMLODIPINE BESYLATE 10 MG/1
TABLET ORAL
Qty: 90 TABLET | Refills: 1 | Status: SHIPPED | OUTPATIENT
Start: 2024-10-18

## 2024-10-18 RX ORDER — ATORVASTATIN CALCIUM 20 MG/1
20 TABLET, FILM COATED ORAL DAILY
Qty: 90 TABLET | Refills: 1 | Status: SHIPPED | OUTPATIENT
Start: 2024-10-18

## 2024-10-18 RX ORDER — LISINOPRIL 40 MG/1
40 TABLET ORAL DAILY
Qty: 90 TABLET | Refills: 1 | Status: SHIPPED | OUTPATIENT
Start: 2024-10-18

## 2024-11-01 DIAGNOSIS — G47.9 SLEEP DISTURBANCES: ICD-10-CM

## 2024-11-01 RX ORDER — HYDROCHLOROTHIAZIDE 25 MG/1
TABLET ORAL
Qty: 90 TABLET | Refills: 2 | Status: SHIPPED | OUTPATIENT
Start: 2024-11-01

## 2024-11-01 RX ORDER — TRAZODONE HYDROCHLORIDE 50 MG/1
TABLET, FILM COATED ORAL
Qty: 180 TABLET | Refills: 1 | Status: SHIPPED | OUTPATIENT
Start: 2024-11-01

## 2025-01-27 LAB
ALBUMIN SERPL-MCNC: 4 G/DL (ref 3.5–5.2)
ALBUMIN/GLOB SERPL: 1.5 G/DL
ALP SERPL-CCNC: 84 U/L (ref 39–117)
ALT SERPL-CCNC: 28 U/L (ref 1–41)
AST SERPL-CCNC: 21 U/L (ref 1–40)
BILIRUB SERPL-MCNC: 0.6 MG/DL (ref 0–1.2)
BUN SERPL-MCNC: 18 MG/DL (ref 8–23)
BUN/CREAT SERPL: 21.4 (ref 7–25)
CALCIUM SERPL-MCNC: 9.4 MG/DL (ref 8.6–10.5)
CHLORIDE SERPL-SCNC: 103 MMOL/L (ref 98–107)
CHOLEST SERPL-MCNC: 121 MG/DL (ref 0–200)
CHOLEST/HDLC SERPL: 3.27 {RATIO}
CO2 SERPL-SCNC: 26 MMOL/L (ref 22–29)
CREAT SERPL-MCNC: 0.84 MG/DL (ref 0.76–1.27)
EGFRCR SERPLBLD CKD-EPI 2021: 99.8 ML/MIN/1.73
GLOBULIN SER CALC-MCNC: 2.6 GM/DL
GLUCOSE SERPL-MCNC: 111 MG/DL (ref 65–99)
HBA1C MFR BLD: 5.4 % (ref 4.8–5.6)
HDLC SERPL-MCNC: 37 MG/DL (ref 40–60)
LDLC SERPL CALC-MCNC: 64 MG/DL (ref 0–100)
POTASSIUM SERPL-SCNC: 4.1 MMOL/L (ref 3.5–5.2)
PROT SERPL-MCNC: 6.6 G/DL (ref 6–8.5)
SODIUM SERPL-SCNC: 139 MMOL/L (ref 136–145)
TRIGL SERPL-MCNC: 105 MG/DL (ref 0–150)
VLDLC SERPL CALC-MCNC: 20 MG/DL (ref 5–40)

## 2025-02-03 ENCOUNTER — OFFICE VISIT (OUTPATIENT)
Dept: INTERNAL MEDICINE | Facility: CLINIC | Age: 60
End: 2025-02-03
Payer: COMMERCIAL

## 2025-02-03 VITALS
SYSTOLIC BLOOD PRESSURE: 124 MMHG | BODY MASS INDEX: 35.07 KG/M2 | DIASTOLIC BLOOD PRESSURE: 76 MMHG | WEIGHT: 245 LBS | HEART RATE: 84 BPM | HEIGHT: 70 IN

## 2025-02-03 DIAGNOSIS — F17.211 CIGARETTE NICOTINE DEPENDENCE IN REMISSION: ICD-10-CM

## 2025-02-03 DIAGNOSIS — K21.9 GASTROESOPHAGEAL REFLUX DISEASE, UNSPECIFIED WHETHER ESOPHAGITIS PRESENT: Primary | ICD-10-CM

## 2025-02-03 DIAGNOSIS — R73.9 ELEVATED BLOOD SUGAR: ICD-10-CM

## 2025-02-03 DIAGNOSIS — E78.2 MIXED HYPERLIPIDEMIA: Chronic | ICD-10-CM

## 2025-02-03 DIAGNOSIS — I10 ESSENTIAL HYPERTENSION: Chronic | ICD-10-CM

## 2025-02-03 PROCEDURE — 99214 OFFICE O/P EST MOD 30 MIN: CPT | Performed by: INTERNAL MEDICINE

## 2025-02-03 NOTE — PROGRESS NOTES
"Chief Complaint  Hypertension    Subjective        Bart Tesfaye presents to [facility]  History of Present Illness  Lots of ongoing family stressors.   During dec/Jan had new epigastric discomfort, indigestion, nausea- occ trouble swallowing, regurgitated once- has become more careful about what and how he eats.  Takes occ TUMS at night- hasn't taken any meds otherwise.  Does seem to be getting better.  Going to gym for walking on track- some R hip discomfort and L knee- denies taking nsaids. Usually sore afterward, not when he's exercising. He doesn't notice swelling.   Quit tob 2015, cut back on alcohol to less than weekly.       Objective   Vital Signs:  /76   Pulse 84   Ht 177.8 cm (70\")   Wt 111 kg (245 lb)   BMI 35.15 kg/m²   Estimated body mass index is 35.15 kg/m² as calculated from the following:    Height as of this encounter: 177.8 cm (70\").    Weight as of this encounter: 111 kg (245 lb).    Class 2 Severe Obesity (BMI >=35 and <=39.9). Obesity-related health conditions include the following: hypertension, dyslipidemias, and osteoarthritis. Obesity is unchanged. BMI is is above average; BMI management plan is completed. We discussed portion control and increasing exercise.      Physical Exam  Constitutional:       Appearance: Normal appearance. He is obese.   Cardiovascular:      Rate and Rhythm: Normal rate and regular rhythm.   Pulmonary:      Effort: Pulmonary effort is normal.      Breath sounds: Normal breath sounds.   Abdominal:      General: Abdomen is flat. Bowel sounds are normal.      Palpations: Abdomen is soft.      Tenderness: There is no abdominal tenderness.   Musculoskeletal:      Right lower leg: No edema.      Left lower leg: No edema.   Lymphadenopathy:      Cervical: No cervical adenopathy.        Result Review :  The following data was reviewed by: Anita Wright MD on 02/03/2025:  CMP          7/23/2024    09:54 1/27/2025    09:40 "   CMP   Glucose 121  111    BUN 12  18    Creatinine 0.93  0.84    Sodium 136  139    Potassium 4.7  4.1    Chloride 100  103    Calcium 9.9  9.4    Total Protein 6.9  6.6    Albumin 4.4  4.0    Globulin 2.5  2.6    Total Bilirubin 0.4  0.6    Alkaline Phosphatase 87  84    AST (SGOT) 19  21    ALT (SGPT) 20  28    BUN/Creatinine Ratio 12.9  21.4      Lipid Panel          7/23/2024    09:54 1/27/2025    09:40   Lipid Panel   Total Cholesterol 139  121    Triglycerides 155  105    HDL Cholesterol 45  37    VLDL Cholesterol 27  20    LDL Cholesterol  67  64      Most Recent A1C          1/27/2025    09:40   HGBA1C Most Recent   Hemoglobin A1C 5.40                Assessment and Plan   Diagnoses and all orders for this visit:    1. Gastroesophageal reflux disease, unspecified whether esophagitis present (Primary)  Comments:  will try Prilosec OTC for 30 days and then d/c- if symptoms recur, will call for GI referral for EGD- sooner if dysphagia persists.    2. Cigarette nicotine dependence in remission  Comments:  check CT - discussed benefits  Orders:  -     CT Chest Low Dose Wo; Future    3. Essential hypertension  Comments:  Controlled, no change    4. Mixed hyperlipidemia  Comments:  at goal    5. Elevated blood sugar             Follow Up   Return in about 6 months (around 8/3/2025) for Annual physical, Lab Before FUP.  Patient was given instructions and counseling regarding his condition or for health maintenance advice. Please see specific information pulled into the AVS if appropriate.

## 2025-04-17 DIAGNOSIS — E78.2 MIXED HYPERLIPIDEMIA: Chronic | ICD-10-CM

## 2025-04-17 DIAGNOSIS — I10 ESSENTIAL HYPERTENSION: Chronic | ICD-10-CM

## 2025-04-17 RX ORDER — ATORVASTATIN CALCIUM 20 MG/1
20 TABLET, FILM COATED ORAL DAILY
Qty: 90 TABLET | Refills: 1 | Status: SHIPPED | OUTPATIENT
Start: 2025-04-17

## 2025-04-17 RX ORDER — AMLODIPINE BESYLATE 10 MG/1
10 TABLET ORAL DAILY
Qty: 90 TABLET | Refills: 1 | Status: SHIPPED | OUTPATIENT
Start: 2025-04-17

## 2025-04-17 RX ORDER — LISINOPRIL 40 MG/1
40 TABLET ORAL DAILY
Qty: 90 TABLET | Refills: 1 | Status: SHIPPED | OUTPATIENT
Start: 2025-04-17

## 2025-04-23 ENCOUNTER — HOSPITAL ENCOUNTER (OUTPATIENT)
Facility: HOSPITAL | Age: 60
Discharge: HOME | End: 2025-04-23
Admitting: INTERNAL MEDICINE
Payer: COMMERCIAL

## 2025-04-23 DIAGNOSIS — F17.211 CIGARETTE NICOTINE DEPENDENCE IN REMISSION: ICD-10-CM

## 2025-04-23 PROCEDURE — 71271 CT THORAX LUNG CANCER SCR C-: CPT

## 2025-04-27 DIAGNOSIS — G47.9 SLEEP DISTURBANCES: ICD-10-CM

## 2025-04-28 RX ORDER — TRAZODONE HYDROCHLORIDE 50 MG/1
TABLET ORAL
Qty: 180 TABLET | Refills: 1 | Status: SHIPPED | OUTPATIENT
Start: 2025-04-28

## 2025-05-08 ENCOUNTER — TELEPHONE (OUTPATIENT)
Dept: OTHER | Facility: HOSPITAL | Age: 60
End: 2025-05-08
Payer: COMMERCIAL

## 2025-05-08 ENCOUNTER — OFFICE VISIT (OUTPATIENT)
Dept: OTHER | Facility: HOSPITAL | Age: 60
End: 2025-05-08
Payer: COMMERCIAL

## 2025-05-08 ENCOUNTER — PREP FOR SURGERY (OUTPATIENT)
Dept: OTHER | Facility: HOSPITAL | Age: 60
End: 2025-05-08
Payer: COMMERCIAL

## 2025-05-08 VITALS
HEART RATE: 77 BPM | DIASTOLIC BLOOD PRESSURE: 89 MMHG | WEIGHT: 238.7 LBS | SYSTOLIC BLOOD PRESSURE: 128 MMHG | BODY MASS INDEX: 34.25 KG/M2 | OXYGEN SATURATION: 96 %

## 2025-05-08 DIAGNOSIS — R91.8 LUNG NODULES: Primary | ICD-10-CM

## 2025-05-08 DIAGNOSIS — R13.12 OROPHARYNGEAL DYSPHAGIA: ICD-10-CM

## 2025-05-08 PROCEDURE — G0463 HOSPITAL OUTPT CLINIC VISIT: HCPCS | Performed by: THORACIC SURGERY (CARDIOTHORACIC VASCULAR SURGERY)

## 2025-05-08 NOTE — LETTER
"May 21, 2025     Anita Wright MD  [address]  [place] [address]    Patient: Bart Tesfaye   YOB: 1965   Date of Visit: 5/8/2025     Dear Anita Wright MD:       Thank you for referring Bart Tesfaye to me for evaluation. Below are the relevant portions of my assessment and plan of care.    If you have questions, please do not hesitate to call me. I look forward to following Bart along with you.         Sincerely,        Chata Cordova MD        CC: No Recipients    Chata Cordova MD  05/21/25 0745  Sign when Signing Visit  Chief Complaint  Lung nodules, mediastinal lymphadenopathy    Subjective        History of Present Illness  The patient is a pleasant 59-year-old gentleman who presents with bilateral lung nodules and mediastinal lymphadenopathy.    He reports no significant health issues, including no changes in his respiratory function. He has a history of hypertension, which is well-managed with medication. He has no known history of autoimmune disorders or rheumatoid arthritis. He has no known family history of autoimmune disorders or rheumatoid arthritis. He has no known adverse reactions to anesthesia.    He occasionally experiences dysphagia, characterized by a sensation of food impaction.    Supplemental Information  He has been experiencing severe allergies.    SOCIAL HISTORY  The patient used to smoke and quit in 2015 after smoking on and off for 35 years, with a maximum of one pack a day.    FAMILY HISTORY  The patient's paternal grandmother had colon cancer, and the maternal grandmother had multiple myeloma. Both grandmothers were in their late 80s when diagnosed.    History of Present Illness    Objective   Vital Signs:  /89   Pulse 77   Wt 108 kg (238 lb 11.2 oz)   SpO2 96%   BMI 34.25 kg/m²   Estimated body mass index is 34.25 kg/m² as calculated from the following:    Height as of 2/3/25: 177.8 cm (70\").    Weight as of this " encounter: 108 kg (238 lb 11.2 oz).            Physical Exam  Vitals and nursing note reviewed.   Constitutional:       Appearance: He is well-developed.   HENT:      Head: Normocephalic and atraumatic.      Nose: Nose normal.   Eyes:      Conjunctiva/sclera: Conjunctivae normal.   Pulmonary:      Effort: Pulmonary effort is normal.   Musculoskeletal:         General: Normal range of motion.      Cervical back: Normal range of motion and neck supple.   Skin:     General: Skin is warm and dry.   Neurological:      Mental Status: He is alert and oriented to person, place, and time.   Psychiatric:         Behavior: Behavior normal.         Thought Content: Thought content normal.         Judgment: Judgment normal.          Physical Exam      Result Review :           Results  Imaging  CT of the chest performed on 4/23/2025 demonstrates a 1.6 cm perihilar nodule in the right lower lobe as well as a 1.6 cm perihilar nodule in the left lower lobe.           Assessment and Plan   Diagnoses and all orders for this visit:    1. Lung nodules (Primary)  -     CT Chest Without Contrast; Future  -     NM PET/CT Skull Base to Mid Thigh; Future  -     Complete PFT - Pre & Post Bronchodilator; Future  -     Hemoglobin; Future        Assessment & Plan  1. Bilateral pulmonary nodules.  The CT scan of the chest performed on 4/23/2025 shows a 1.6 cm perihilar nodule in the right lower lobe and a 1.6 cm perihilar nodule in the left lower lobe. These could represent bronchitic malignancy or a benign process such as sarcoidosis. A biopsy of both nodules is recommended using Ion bronchoscopy. A PET CT scan and pulmonary function studies are also advised. The biopsy is an outpatient procedure with less than a 2% chance of complications. If the biopsy confirms sarcoidosis, no treatment is necessary as it typically resolves on its own. If malignancy is detected, further treatment options will be discussed.    2. Mediastinal  lymphadenopathy.  The CT scan also reveals enlarged mediastinal lymph nodes. A biopsy of these lymph nodes is recommended using endobronchial ultrasound biopsy. This will help determine if the lymphadenopathy is due to sarcoidosis or another condition. The procedure is low risk and outpatient. If the biopsy confirms sarcoidosis, no treatment is necessary. If another condition is identified, appropriate treatment will be discussed.    3. Dysphagia.  The patient reports occasional sensation of food getting stuck, which may be due to scarring or rings from reflux. An esophageal scope will be performed concurrently with the lung and lymph node biopsies to investigate this issue.         Follow Up   No follow-ups on file.  Patient was given instructions and counseling regarding his condition or for health maintenance advice. Please see specific information pulled into the AVS if appropriate.     Patient or patient representative verbalized consent for the use of Ambient Listening during the visit with  Chata Cordova MD for chart documentation. 5/21/2025  07:45 EDT

## 2025-05-08 NOTE — TELEPHONE ENCOUNTER
Patient is scheduled for PFTs at [facility] on 05/14 @ 10 AM. Faxed order and demographics

## 2025-05-08 NOTE — H&P (VIEW-ONLY)
"Chief Complaint  Lung nodules, mediastinal lymphadenopathy    Subjective        History of Present Illness  The patient is a pleasant 59-year-old gentleman who presents with bilateral lung nodules and mediastinal lymphadenopathy.    He reports no significant health issues, including no changes in his respiratory function. He has a history of hypertension, which is well-managed with medication. He has no known history of autoimmune disorders or rheumatoid arthritis. He has no known family history of autoimmune disorders or rheumatoid arthritis. He has no known adverse reactions to anesthesia.    He occasionally experiences dysphagia, characterized by a sensation of food impaction.    Supplemental Information  He has been experiencing severe allergies.    SOCIAL HISTORY  The patient used to smoke and quit in 2015 after smoking on and off for 35 years, with a maximum of one pack a day.    FAMILY HISTORY  The patient's paternal grandmother had colon cancer, and the maternal grandmother had multiple myeloma. Both grandmothers were in their late 80s when diagnosed.    History of Present Illness    Objective   Vital Signs:  /89   Pulse 77   Wt 108 kg (238 lb 11.2 oz)   SpO2 96%   BMI 34.25 kg/m²   Estimated body mass index is 34.25 kg/m² as calculated from the following:    Height as of 2/3/25: 177.8 cm (70\").    Weight as of this encounter: 108 kg (238 lb 11.2 oz).            Physical Exam  Vitals and nursing note reviewed.   Constitutional:       Appearance: He is well-developed.   HENT:      Head: Normocephalic and atraumatic.      Nose: Nose normal.   Eyes:      Conjunctiva/sclera: Conjunctivae normal.   Pulmonary:      Effort: Pulmonary effort is normal.   Musculoskeletal:         General: Normal range of motion.      Cervical back: Normal range of motion and neck supple.   Skin:     General: Skin is warm and dry.   Neurological:      Mental Status: He is alert and oriented to person, place, and time. "   Psychiatric:         Behavior: Behavior normal.         Thought Content: Thought content normal.         Judgment: Judgment normal.          Physical Exam      Result Review :           Results  Imaging  CT of the chest performed on 4/23/2025 demonstrates a 1.6 cm perihilar nodule in the right lower lobe as well as a 1.6 cm perihilar nodule in the left lower lobe.           Assessment and Plan   Diagnoses and all orders for this visit:    1. Lung nodules (Primary)  -     CT Chest Without Contrast; Future  -     NM PET/CT Skull Base to Mid Thigh; Future  -     Complete PFT - Pre & Post Bronchodilator; Future  -     Hemoglobin; Future        Assessment & Plan  1. Bilateral pulmonary nodules.  The CT scan of the chest performed on 4/23/2025 shows a 1.6 cm perihilar nodule in the right lower lobe and a 1.6 cm perihilar nodule in the left lower lobe. These could represent bronchitic malignancy or a benign process such as sarcoidosis. A biopsy of both nodules is recommended using Ion bronchoscopy. A PET CT scan and pulmonary function studies are also advised. The biopsy is an outpatient procedure with less than a 2% chance of complications. If the biopsy confirms sarcoidosis, no treatment is necessary as it typically resolves on its own. If malignancy is detected, further treatment options will be discussed.    2. Mediastinal lymphadenopathy.  The CT scan also reveals enlarged mediastinal lymph nodes. A biopsy of these lymph nodes is recommended using endobronchial ultrasound biopsy. This will help determine if the lymphadenopathy is due to sarcoidosis or another condition. The procedure is low risk and outpatient. If the biopsy confirms sarcoidosis, no treatment is necessary. If another condition is identified, appropriate treatment will be discussed.    3. Dysphagia.  The patient reports occasional sensation of food getting stuck, which may be due to scarring or rings from reflux. An esophageal scope will be performed  concurrently with the lung and lymph node biopsies to investigate this issue.         Follow Up   No follow-ups on file.  Patient was given instructions and counseling regarding his condition or for health maintenance advice. Please see specific information pulled into the AVS if appropriate.     Patient or patient representative verbalized consent for the use of Ambient Listening during the visit with  Chata Cordova MD for chart documentation. 5/21/2025  07:45 EDT

## 2025-05-14 ENCOUNTER — HOSPITAL ENCOUNTER (OUTPATIENT)
Dept: PET IMAGING | Facility: HOSPITAL | Age: 60
Discharge: HOME | End: 2025-05-14
Payer: COMMERCIAL

## 2025-05-14 ENCOUNTER — LAB (OUTPATIENT)
Facility: HOSPITAL | Age: 60
End: 2025-05-14
Payer: COMMERCIAL

## 2025-05-14 DIAGNOSIS — R91.8 LUNG NODULES: ICD-10-CM

## 2025-05-14 LAB
GLUCOSE BLDC GLUCOMTR-MCNC: 109 MG/DL (ref 70–130)
HGB BLD-MCNC: 15.5 G/DL (ref 13–17.7)

## 2025-05-14 PROCEDURE — 34310000005 FLUDEOXYGLUCOSE F18 SOLUTION: Performed by: THORACIC SURGERY (CARDIOTHORACIC VASCULAR SURGERY)

## 2025-05-14 PROCEDURE — 82948 REAGENT STRIP/BLOOD GLUCOSE: CPT

## 2025-05-14 PROCEDURE — A9552 F18 FDG: HCPCS | Performed by: THORACIC SURGERY (CARDIOTHORACIC VASCULAR SURGERY)

## 2025-05-14 PROCEDURE — 71250 CT THORAX DX C-: CPT

## 2025-05-14 PROCEDURE — 85018 HEMOGLOBIN: CPT

## 2025-05-14 PROCEDURE — 36415 COLL VENOUS BLD VENIPUNCTURE: CPT

## 2025-05-14 PROCEDURE — 78815 PET IMAGE W/CT SKULL-THIGH: CPT

## 2025-05-14 RX ADMIN — FLUDEOXYGLUCOSE F 18 1 DOSE: 200 INJECTION, SOLUTION INTRAVENOUS at 12:56

## 2025-05-15 ENCOUNTER — TELEPHONE (OUTPATIENT)
Dept: SURGERY | Facility: CLINIC | Age: 60
End: 2025-05-15
Payer: COMMERCIAL

## 2025-05-15 DIAGNOSIS — R68.89 SUSPECTED LUNG CANCER: Primary | ICD-10-CM

## 2025-05-15 NOTE — TELEPHONE ENCOUNTER
Radiologist department contacted our office regarding abnormal finding of ventricles in the brain seen on recent PET scan.      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  He is asymptomatic.  Will plan to cancel brain MRI.  Follow-up with Dr. Cordova for Ion bronchoscopy with endobronchial ultrasound on 5/28/25.

## 2025-05-19 ENCOUNTER — TELEPHONE (OUTPATIENT)
Dept: OTHER | Facility: HOSPITAL | Age: 60
End: 2025-05-19
Payer: COMMERCIAL

## 2025-05-19 NOTE — TELEPHONE ENCOUNTER
Called patient. We are cancelling his 5/22 [facility] app[place] since his biopsy is 5/28 and he has an office follow up with Dr. Cordova 6/2. Provided him with her office address.

## 2025-05-28 ENCOUNTER — HOSPITAL ENCOUNTER (OUTPATIENT)
Facility: HOSPITAL | Age: 60
Setting detail: HOSPITAL OUTPATIENT SURGERY
Discharge: HOME | End: 2025-05-28
Attending: THORACIC SURGERY (CARDIOTHORACIC VASCULAR SURGERY) | Admitting: THORACIC SURGERY (CARDIOTHORACIC VASCULAR SURGERY)
Payer: COMMERCIAL

## 2025-05-28 ENCOUNTER — ANESTHESIA EVENT (OUTPATIENT)
Dept: GASTROENTEROLOGY | Facility: HOSPITAL | Age: 60
End: 2025-05-28
Payer: COMMERCIAL

## 2025-05-28 ENCOUNTER — APPOINTMENT (OUTPATIENT)
Dept: GENERAL RADIOLOGY | Facility: HOSPITAL | Age: 60
End: 2025-05-28
Payer: COMMERCIAL

## 2025-05-28 ENCOUNTER — ANESTHESIA (OUTPATIENT)
Dept: GASTROENTEROLOGY | Facility: HOSPITAL | Age: 60
End: 2025-05-28
Payer: COMMERCIAL

## 2025-05-28 VITALS
TEMPERATURE: 97.6 F | HEART RATE: 74 BPM | BODY MASS INDEX: 33.65 KG/M2 | WEIGHT: 240.4 LBS | RESPIRATION RATE: 12 BRPM | DIASTOLIC BLOOD PRESSURE: 75 MMHG | OXYGEN SATURATION: 92 % | HEIGHT: 71 IN | SYSTOLIC BLOOD PRESSURE: 107 MMHG

## 2025-05-28 DIAGNOSIS — R13.12 OROPHARYNGEAL DYSPHAGIA: ICD-10-CM

## 2025-05-28 DIAGNOSIS — R91.8 LUNG NODULES: ICD-10-CM

## 2025-05-28 PROCEDURE — 25010000002 PHENYLEPHRINE 10 MG/ML SOLUTION 5 ML VIAL: Performed by: NURSE ANESTHETIST, CERTIFIED REGISTERED

## 2025-05-28 PROCEDURE — 88177 CYTP FNA EVAL EA ADDL: CPT | Performed by: THORACIC SURGERY (CARDIOTHORACIC VASCULAR SURGERY)

## 2025-05-28 PROCEDURE — 25010000002 FENTANYL CITRATE (PF) 100 MCG/2ML SOLUTION: Performed by: NURSE ANESTHETIST, CERTIFIED REGISTERED

## 2025-05-28 PROCEDURE — 88172 CYTP DX EVAL FNA 1ST EA SITE: CPT | Performed by: THORACIC SURGERY (CARDIOTHORACIC VASCULAR SURGERY)

## 2025-05-28 PROCEDURE — 76000 FLUOROSCOPY <1 HR PHYS/QHP: CPT

## 2025-05-28 PROCEDURE — 31633 BRONCHOSCOPY/NEEDLE BX ADDL: CPT | Performed by: THORACIC SURGERY (CARDIOTHORACIC VASCULAR SURGERY)

## 2025-05-28 PROCEDURE — 25010000002 MIDAZOLAM PER 1 MG: Performed by: NURSE ANESTHETIST, CERTIFIED REGISTERED

## 2025-05-28 PROCEDURE — 25010000002 SUGAMMADEX 200 MG/2ML SOLUTION: Performed by: NURSE ANESTHETIST, CERTIFIED REGISTERED

## 2025-05-28 PROCEDURE — 88305 TISSUE EXAM BY PATHOLOGIST: CPT | Performed by: THORACIC SURGERY (CARDIOTHORACIC VASCULAR SURGERY)

## 2025-05-28 PROCEDURE — 25810000003 SODIUM CHLORIDE 0.9 % SOLUTION: Performed by: NURSE ANESTHETIST, CERTIFIED REGISTERED

## 2025-05-28 PROCEDURE — 88108 CYTOPATH CONCENTRATE TECH: CPT | Performed by: THORACIC SURGERY (CARDIOTHORACIC VASCULAR SURGERY)

## 2025-05-28 PROCEDURE — 31629 BRONCHOSCOPY/NEEDLE BX EACH: CPT | Performed by: THORACIC SURGERY (CARDIOTHORACIC VASCULAR SURGERY)

## 2025-05-28 PROCEDURE — 31624 DX BRONCHOSCOPE/LAVAGE: CPT | Performed by: THORACIC SURGERY (CARDIOTHORACIC VASCULAR SURGERY)

## 2025-05-28 PROCEDURE — 25010000002 PROPOFOL 1000 MG/100ML EMULSION: Performed by: NURSE ANESTHETIST, CERTIFIED REGISTERED

## 2025-05-28 PROCEDURE — 31627 NAVIGATIONAL BRONCHOSCOPY: CPT | Performed by: THORACIC SURGERY (CARDIOTHORACIC VASCULAR SURGERY)

## 2025-05-28 PROCEDURE — 25810000003 SODIUM CHLORIDE 0.9 % SOLUTION 250 ML FLEX CONT: Performed by: NURSE ANESTHETIST, CERTIFIED REGISTERED

## 2025-05-28 PROCEDURE — 25010000002 GLYCOPYRROLATE 1 MG/5ML SOLUTION: Performed by: NURSE ANESTHETIST, CERTIFIED REGISTERED

## 2025-05-28 PROCEDURE — 25010000002 LIDOCAINE 2% SOLUTION: Performed by: NURSE ANESTHETIST, CERTIFIED REGISTERED

## 2025-05-28 PROCEDURE — 25010000002 DEXAMETHASONE PER 1 MG: Performed by: NURSE ANESTHETIST, CERTIFIED REGISTERED

## 2025-05-28 PROCEDURE — 71045 X-RAY EXAM CHEST 1 VIEW: CPT

## 2025-05-28 PROCEDURE — 25010000002 ONDANSETRON PER 1 MG: Performed by: NURSE ANESTHETIST, CERTIFIED REGISTERED

## 2025-05-28 PROCEDURE — 88173 CYTOPATH EVAL FNA REPORT: CPT | Performed by: THORACIC SURGERY (CARDIOTHORACIC VASCULAR SURGERY)

## 2025-05-28 PROCEDURE — 43239 EGD BIOPSY SINGLE/MULTIPLE: CPT | Performed by: THORACIC SURGERY (CARDIOTHORACIC VASCULAR SURGERY)

## 2025-05-28 RX ORDER — DIPHENHYDRAMINE HYDROCHLORIDE 50 MG/ML
12.5 INJECTION, SOLUTION INTRAMUSCULAR; INTRAVENOUS ONCE AS NEEDED
Status: DISCONTINUED | OUTPATIENT
Start: 2025-05-28 | End: 2025-05-28 | Stop reason: HOSPADM

## 2025-05-28 RX ORDER — OXYCODONE HYDROCHLORIDE 5 MG/1
5 TABLET ORAL ONCE AS NEEDED
Refills: 0 | Status: DISCONTINUED | OUTPATIENT
Start: 2025-05-28 | End: 2025-05-28 | Stop reason: HOSPADM

## 2025-05-28 RX ORDER — LIDOCAINE HYDROCHLORIDE 20 MG/ML
INJECTION, SOLUTION INFILTRATION; PERINEURAL AS NEEDED
Status: DISCONTINUED | OUTPATIENT
Start: 2025-05-28 | End: 2025-05-28 | Stop reason: SURG

## 2025-05-28 RX ORDER — PROPOFOL 10 MG/ML
INJECTION, EMULSION INTRAVENOUS AS NEEDED
Status: DISCONTINUED | OUTPATIENT
Start: 2025-05-28 | End: 2025-05-28 | Stop reason: SURG

## 2025-05-28 RX ORDER — ONDANSETRON 2 MG/ML
INJECTION INTRAMUSCULAR; INTRAVENOUS AS NEEDED
Status: DISCONTINUED | OUTPATIENT
Start: 2025-05-28 | End: 2025-05-28 | Stop reason: SURG

## 2025-05-28 RX ORDER — FENTANYL CITRATE 50 UG/ML
50 INJECTION, SOLUTION INTRAMUSCULAR; INTRAVENOUS
Status: DISCONTINUED | OUTPATIENT
Start: 2025-05-28 | End: 2025-05-28 | Stop reason: HOSPADM

## 2025-05-28 RX ORDER — OXYCODONE HYDROCHLORIDE 5 MG/1
10 TABLET ORAL EVERY 4 HOURS PRN
Refills: 0 | Status: DISCONTINUED | OUTPATIENT
Start: 2025-05-28 | End: 2025-05-28 | Stop reason: HOSPADM

## 2025-05-28 RX ORDER — HYDRALAZINE HYDROCHLORIDE 20 MG/ML
5 INJECTION INTRAMUSCULAR; INTRAVENOUS
Status: DISCONTINUED | OUTPATIENT
Start: 2025-05-28 | End: 2025-05-28 | Stop reason: HOSPADM

## 2025-05-28 RX ORDER — FLUMAZENIL 0.1 MG/ML
0.2 INJECTION INTRAVENOUS AS NEEDED
Status: DISCONTINUED | OUTPATIENT
Start: 2025-05-28 | End: 2025-05-28 | Stop reason: HOSPADM

## 2025-05-28 RX ORDER — LIDOCAINE 50 MG/G
OINTMENT TOPICAL AS NEEDED
Status: DISCONTINUED | OUTPATIENT
Start: 2025-05-28 | End: 2025-05-28 | Stop reason: HOSPADM

## 2025-05-28 RX ORDER — EPHEDRINE SULFATE 5 MG/ML
5 INJECTION INTRAVENOUS ONCE AS NEEDED
Status: DISCONTINUED | OUTPATIENT
Start: 2025-05-28 | End: 2025-05-28 | Stop reason: HOSPADM

## 2025-05-28 RX ORDER — ROCURONIUM BROMIDE 10 MG/ML
INJECTION, SOLUTION INTRAVENOUS AS NEEDED
Status: DISCONTINUED | OUTPATIENT
Start: 2025-05-28 | End: 2025-05-28 | Stop reason: SURG

## 2025-05-28 RX ORDER — SODIUM CHLORIDE 9 MG/ML
INJECTION, SOLUTION INTRAVENOUS CONTINUOUS PRN
Status: DISCONTINUED | OUTPATIENT
Start: 2025-05-28 | End: 2025-05-28 | Stop reason: SURG

## 2025-05-28 RX ORDER — DEXAMETHASONE SODIUM PHOSPHATE 4 MG/ML
INJECTION, SOLUTION INTRA-ARTICULAR; INTRALESIONAL; INTRAMUSCULAR; INTRAVENOUS; SOFT TISSUE AS NEEDED
Status: DISCONTINUED | OUTPATIENT
Start: 2025-05-28 | End: 2025-05-28 | Stop reason: SURG

## 2025-05-28 RX ORDER — IPRATROPIUM BROMIDE AND ALBUTEROL SULFATE 2.5; .5 MG/3ML; MG/3ML
3 SOLUTION RESPIRATORY (INHALATION) ONCE AS NEEDED
Status: DISCONTINUED | OUTPATIENT
Start: 2025-05-28 | End: 2025-05-28 | Stop reason: HOSPADM

## 2025-05-28 RX ORDER — LIDOCAINE HYDROCHLORIDE 40 MG/ML
SOLUTION TOPICAL AS NEEDED
Status: DISCONTINUED | OUTPATIENT
Start: 2025-05-28 | End: 2025-05-28 | Stop reason: SURG

## 2025-05-28 RX ORDER — LABETALOL HYDROCHLORIDE 5 MG/ML
5 INJECTION, SOLUTION INTRAVENOUS
Status: DISCONTINUED | OUTPATIENT
Start: 2025-05-28 | End: 2025-05-28 | Stop reason: HOSPADM

## 2025-05-28 RX ORDER — LIDOCAINE HYDROCHLORIDE 40 MG/ML
SOLUTION TOPICAL
Status: COMPLETED
Start: 2025-05-28 | End: 2025-05-28

## 2025-05-28 RX ORDER — GLYCOPYRROLATE 0.2 MG/ML
INJECTION INTRAMUSCULAR; INTRAVENOUS AS NEEDED
Status: DISCONTINUED | OUTPATIENT
Start: 2025-05-28 | End: 2025-05-28 | Stop reason: SURG

## 2025-05-28 RX ORDER — PROPOFOL 10 MG/ML
INJECTION, EMULSION INTRAVENOUS CONTINUOUS PRN
Status: DISCONTINUED | OUTPATIENT
Start: 2025-05-28 | End: 2025-05-28 | Stop reason: SURG

## 2025-05-28 RX ORDER — PHENYLEPHRINE HYDROCHLORIDE 10 MG/ML
INJECTION INTRAVENOUS AS NEEDED
Status: DISCONTINUED | OUTPATIENT
Start: 2025-05-28 | End: 2025-05-28

## 2025-05-28 RX ORDER — DIPHENHYDRAMINE HYDROCHLORIDE 50 MG/ML
12.5 INJECTION, SOLUTION INTRAMUSCULAR; INTRAVENOUS
Status: DISCONTINUED | OUTPATIENT
Start: 2025-05-28 | End: 2025-05-28 | Stop reason: HOSPADM

## 2025-05-28 RX ORDER — FENTANYL CITRATE 50 UG/ML
INJECTION, SOLUTION INTRAMUSCULAR; INTRAVENOUS AS NEEDED
Status: DISCONTINUED | OUTPATIENT
Start: 2025-05-28 | End: 2025-05-28 | Stop reason: SURG

## 2025-05-28 RX ORDER — ONDANSETRON 2 MG/ML
4 INJECTION INTRAMUSCULAR; INTRAVENOUS ONCE AS NEEDED
Status: DISCONTINUED | OUTPATIENT
Start: 2025-05-28 | End: 2025-05-28 | Stop reason: HOSPADM

## 2025-05-28 RX ORDER — NALOXONE HCL 0.4 MG/ML
0.4 VIAL (ML) INJECTION AS NEEDED
Status: DISCONTINUED | OUTPATIENT
Start: 2025-05-28 | End: 2025-05-28 | Stop reason: HOSPADM

## 2025-05-28 RX ORDER — MIDAZOLAM HYDROCHLORIDE 1 MG/ML
INJECTION, SOLUTION INTRAMUSCULAR; INTRAVENOUS AS NEEDED
Status: DISCONTINUED | OUTPATIENT
Start: 2025-05-28 | End: 2025-05-28 | Stop reason: SURG

## 2025-05-28 RX ADMIN — SODIUM CHLORIDE: 9 INJECTION, SOLUTION INTRAVENOUS at 08:14

## 2025-05-28 RX ADMIN — ONDANSETRON 4 MG: 2 INJECTION INTRAMUSCULAR; INTRAVENOUS at 09:32

## 2025-05-28 RX ADMIN — SUGAMMADEX 200 MG: 100 INJECTION, SOLUTION INTRAVENOUS at 09:32

## 2025-05-28 RX ADMIN — ROCURONIUM BROMIDE 10 MG: 10 INJECTION, SOLUTION INTRAVENOUS at 09:10

## 2025-05-28 RX ADMIN — FENTANYL CITRATE 50 MCG: 50 INJECTION, SOLUTION INTRAMUSCULAR; INTRAVENOUS at 08:19

## 2025-05-28 RX ADMIN — PROPOFOL INJECTABLE EMULSION 150 MCG/KG/MIN: 10 INJECTION, EMULSION INTRAVENOUS at 08:19

## 2025-05-28 RX ADMIN — GLYCOPYRROLATE 0.1 MCG: 0.2 INJECTION INTRAMUSCULAR; INTRAVENOUS at 09:14

## 2025-05-28 RX ADMIN — GLYCOPYRROLATE 0.1 MCG: 0.2 INJECTION INTRAMUSCULAR; INTRAVENOUS at 09:01

## 2025-05-28 RX ADMIN — PHENYLEPHRINE HYDROCHLORIDE 0.4 MCG/KG/MIN: 10 INJECTION INTRAVENOUS at 08:22

## 2025-05-28 RX ADMIN — DEXAMETHASONE SODIUM PHOSPHATE 4 MG: 4 INJECTION, SOLUTION INTRAMUSCULAR; INTRAVENOUS at 09:32

## 2025-05-28 RX ADMIN — MIDAZOLAM 2 MG: 1 INJECTION INTRAMUSCULAR; INTRAVENOUS at 08:17

## 2025-05-28 RX ADMIN — ROCURONIUM BROMIDE 50 MG: 10 INJECTION, SOLUTION INTRAVENOUS at 08:19

## 2025-05-28 RX ADMIN — ROCURONIUM BROMIDE 10 MG: 10 INJECTION, SOLUTION INTRAVENOUS at 08:43

## 2025-05-28 RX ADMIN — LIDOCAINE HYDROCHLORIDE 40 MG: 20 INJECTION, SOLUTION INFILTRATION; PERINEURAL at 08:19

## 2025-05-28 RX ADMIN — FENTANYL CITRATE 50 MCG: 50 INJECTION, SOLUTION INTRAMUSCULAR; INTRAVENOUS at 08:42

## 2025-05-28 RX ADMIN — PROPOFOL 180 MG: 10 INJECTION, EMULSION INTRAVENOUS at 08:19

## 2025-05-28 RX ADMIN — ROCURONIUM BROMIDE 10 MG: 10 INJECTION, SOLUTION INTRAVENOUS at 09:23

## 2025-05-28 RX ADMIN — LIDOCAINE HYDROCHLORIDE 1 EACH: 40 SOLUTION TOPICAL at 08:21

## 2025-05-28 NOTE — OP NOTE
BRONCHOSCOPY NAVIGATION WITH ENDOBRONCHIAL ULTRASOUND AND ION ROBOT  Procedure Report    Patient Name:  Bart Tesfaye  YOB: 1965    Date of Surgery:  5/28/2025     Indications: Bilateral hilar nodules, mediastinal and hilar lymphadenopathy, esophageal and gastric hypermetabolism    Pre-op Diagnosis:   Lung nodules [R91.8]  Oropharyngeal dysphagia [R13.12]       Post-Op Diagnosis Codes:     * Lung nodules [R91.8]     * Oropharyngeal dysphagia [R13.12]    Procedure(s):  BRONCHOSCOPY NAVIGATION WITH ENDOBRONCHIAL ULTRASOUND AND ION ROBOT  WITH FINE NEEDLE ASPIRATION X5 AREAS AND BRONCHOALVEOLAR LAVAGE X2 AREAS  ESOPHAGOGASTRODUODENOSCOPY WITH COLD FORCEP BIOPSY X1 AREA    Staff:  Surgeon(s):  Chata Cordova MD Mahan, Angela L, MD    Anesthesia: General    Estimated Blood Loss: minimal    Implants:    Nothing was implanted during the procedure    Specimen:          Specimens       ID Source Type Tests Collected By Collected At Frozen?    A Lung, Right Lower Lobe Fine Needle Aspirate FINE NEEDLE ASPIRATION   Chata Cordova MD 5/28/25 0829     Description: dry slidse, alcohol slides, cell block    This specimen was not marked as sent.    B Lung, Right Lower Lobe Lavage NON-GYNECOLOGIC CYTOLOGY   Chata Cordova MD 5/28/25 0843     This specimen was not marked as sent.    C Lung, Left Lower Lobe Fine Needle Aspirate FINE NEEDLE ASPIRATION   Chata Cordova MD 5/28/25 0845     Description: dry slides, alcohol slides, cell block    This specimen was not marked as sent.    D Lung, Left Lower Lobe Lavage NON-GYNECOLOGIC CYTOLOGY   Chata Cordova MD 5/28/25 0858     This specimen was not marked as sent.    E Lymph Node Fine Needle Aspirate FINE NEEDLE ASPIRATION   Chata Cordova MD 5/28/25 0858     Description: LEVEL 7 DRY SLIDES, ALCOHOL SLIDES, CELL BLOCK    This specimen was not marked as sent.    F Lymph Node Fine Needle Aspirate FINE NEEDLE ASPIRATION   Chata Cordova MD 5/28/25 0907      Description: RIGHT LEVEL 10 LYMPH NODE DRY SLIDES, ALCOHOL SLIDES, CELL BLOCK    This specimen was not marked as sent.    G Lymph Node Fine Needle Aspirate FINE NEEDLE ASPIRATION   Chata Cordova MD 5/28/25 0920     Description: RIGHT LEVEL 10 SEND FOR FLOW CYTOMETRY    Comment: THIS SPECIMEN IS FOR FLOW CYTOMETRY UNABLE TO ADD TEST IN EPIC    This specimen was not marked as sent.    H Lymph Node Fine Needle Aspirate FINE NEEDLE ASPIRATION   Chata Cordova MD 5/28/25 0956     Description: LEFT LEVEL 10 DRY SLIDES, ALCOHOL SLIDES, CELL BLOCK    This specimen was not marked as sent.    I GE Junction Tissue TISSUE PATHOLOGY EXAM   Chata Cordova MD 5/28/25 0987     Description: GE JUNCTION AT 40cm    This specimen was not marked as sent.              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  Prior to bringing the patient to the endoscopy suite, planning was performed to allow navigation to the right and left lower lobe hilar nodules.  He was transferred to the endoscopy suite placed on the endoscopy table in supine position.  A general anesthetic was successfully administered and He was intubated without difficulty.  A timeout was performed.    The Olympus endoscope was introduced in the patient's airway and examination was conducted to the secondary jordan.  All the secretions were evacuated to facilitate robotic navigation.  The Ion robotic navigation system was docked to the patient in standard fashion.  The airway was registered.  We were able to navigate to the lesion in the right lower lobe.  Radial endobronchial ultrasound was used to confirm that we were in the lesion we had good concentric signal.  Multiple biopsies of the mass were performed using a 21-gauge biopsy needle in a concentric location around the entire mass.   Fluoroscopy was used while passing instruments and tools.  Rapid onsite evaluation does not demonstrate malignancy.  A bronchoalveolar lavage was performed.    We were able to navigate to the lesion in the left lower lobe.  Radial endobronchial ultrasound was used to confirm that we were in the lesion we had good concentric signal.  Multiple biopsies of the mass were performed using a 21-gauge biopsy needle in a concentric location around the entire mass.  Fluoroscopy was used while passing instruments and tools.  Rapid onsite evaluation does not demonstrate malignancy.  A bronchoalveolar lavage was performed.    The scope was withdrawn and the endobronchial ultrasound scope was placed in patient's airway.  The right paratracheal space and subcarinal space were examined.  The subcarinal space had an enlarged lymph node, this was biopsied with multiple passes of a 21-gauge EBUS needle.  The right hilar space was examined and an enlarged lymph node was encountered.  Multiple biopsies were taken with a 21-gauge EBUS needle.  Rapid onsite evaluation demonstrated benign lymphoid tissue.  Flow cytometry was sent.  The left hilar space was examined and a large lymph node was encountered.  Multiple biopsies were taken of this lymph node with a 21-gauge EBUS needle.  Rapid onsite evaluation demonstrated benign lymphoid tissue.  The right and left paratracheal spaces were examined and there was no significantly enlarged lymph node encountered.      The endobronchial ultrasound scope was withdrawn and replaced with the Olympus video endoscope.  A small amount of blood was evacuated from the airway.  There was no evidence of significant bleeding.        The Olympus video endoscope was introduced into the patient's esophagus and examination was conducted to the duodenum.  There were no abnormalities noted in the stomach, esophagus or duodenum.  All were normal in appearance.  Biopsies were taken at the [facility] biopsy force.   There is no evidence of bleeding after biopsies.  The scope was withdrawn.  The patient tolerated this procedure well, was extubated and transferred to recovery room in stable condition.      Chata Cordova MD     Date: 5/28/2025  Time: 09:37 EDT

## 2025-05-28 NOTE — DISCHARGE INSTRUCTIONS
Endoscopic ultrasound and Robotic bronchoscopy with fine needle aspiration    Samples (biopsies) of the lymph nodes are taken from inside the lungs and sent for diagnostic testing.    Final results of your biopsy take up to 5 business days to process; your endoscopic physician will contact you with your results.    EBUS and robotic bronchoscopy is recommended in the following instances:  To diagnose different types of lung disorders (such as sarcoidosis or tuberculosis)  To diagnose or 'stage' cancer   To investigate enlarged lymph  nodes in the chest    Due to effects of sedation, do not drive or operate heavy machinery for 24 hours.    Avoid heavy lifting (>10 lbs.) or strenuous activity for 48 hours.    Continue to avoid non-steroidal anti-inflammatory medications (NSAIDS) for 5-7 days after the procedure unless told otherwise by your endoscopic and primary care physicians.    Some patients have a temporary sore throat after the procedure; this is a normal finding and over-the-counter lozenges help soothe symptoms.    You may resume normal diet once you are discharged.     Avoid red-colored foods for 24 hours.      You may resume your blood thinner medications (if applicable) as directed by your endoscopic and primary care physicians.    It is normal to have a very small amount of blood-tinged sputum immediately after the procedure. This should resolve within 3-4 days.    Although complications are rare, there is a small risk of pain, collapsed lung, bleeding and infection. Contact your endoscopic physician if you have these signs or symptoms:  Temperature greater than 102°F  Worsening pain not relieved by medications  Go to your nearest emergency room is you experience:  Shaking, chills or a temperature over 102°F.    New, sudden difficulty breathing.    New pain when taking a deep breath.    New, sudden chest pain.  Worsening cough that produces large amounts of blood.     Dr. Cordova: [phone]

## 2025-05-28 NOTE — ANESTHESIA PROCEDURE NOTES
Airway  Reason: elective    Date/Time: 5/28/2025 8:21 AM  Airway not difficult    General Information and Staff    Patient location during procedure: OR  CRNA/CAA: Sudha Juarez, CRNA    Indications and Patient Condition  Indications for airway management: airway protection    Preoxygenated: yes    Mask difficulty assessment: 2 - vent by mask + OA or adjuvant +/- NMBA    Final Airway Details    Final airway type: endotracheal airway      Successful airway: ETT  Cuffed: yes   Successful intubation technique: video laryngoscopy  Adjuncts used in placement: intubating stylet  Endotracheal tube insertion site: oral  Blade: Gotti  Blade size: 3  ETT size (mm): 8.5  Cormack-Lehane Classification: grade I - full view of glottis  Placement verified by: capnometry   Measured from: lips  ETT/EBT  to lips (cm): 21  Number of attempts at approach: 1  Assessment: lips, teeth, and gum same as pre-op and atraumatic intubation    Additional Comments  Placement confirmed via bronchoscope per Dr. Cordova

## 2025-05-28 NOTE — ANESTHESIA POSTPROCEDURE EVALUATION
Patient: Bart Tesfaye    Procedure Summary       Date: 05/28/25 Room / Location: [facility] 3 / [facility]    Anesthesia Start: 0814 Anesthesia Stop: 0945    Procedures:       BRONCHOSCOPY NAVIGATION WITH ENDOBRONCHIAL ULTRASOUND AND ION ROBOT  WITH FINE NEEDLE ASPIRATION X5 AREAS AND BRONCHOALVEOLAR LAVAGE X2 AREAS      ESOPHAGOGASTRODUODENOSCOPY WITH COLD FORCEP BIOPSY X1 AREA Diagnosis:       Lung nodules      Oropharyngeal dysphagia      (Lung nodules [R91.8])      (Oropharyngeal dysphagia [R13.12])    Surgeons: Chata Cordova MD Provider: Martinez Lyn MD    Anesthesia Type: general ASA Status: 2            Anesthesia Type: general    Vitals  Vitals Value Taken Time   /75 05/28/25 10:40   Temp 97.6 °F (36.4 °C) 05/28/25 09:47   Pulse 73 05/28/25 10:44   Resp 12 05/28/25 10:40   SpO2 91 % 05/28/25 10:44   Vitals shown include unfiled device data.        Post Anesthesia Care and Evaluation    Patient location during evaluation: PACU  Patient participation: complete - patient participated  Level of consciousness: awake  Pain scale: See nurse's notes for pain score.  Pain management: adequate    Airway patency: patent  Anesthetic complications: No anesthetic complications  PONV Status: none  Cardiovascular status: acceptable  Respiratory status: acceptable and spontaneous ventilation  Hydration status: acceptable    Comments: Patient seen and examined postoperatively; vital signs stable; SpO2 greater than or equal to 90%; cardiopulmonary status stable; nausea/vomiting adequately controlled; pain adequately controlled; no apparent anesthesia complications; patient discharged from anesthesia care when discharge criteria were met

## 2025-05-29 LAB
BEAKER LAB AP INTRAOPERATIVE CONSULTATION: NORMAL
LAB AP CASE REPORT: NORMAL
LAB AP DIAGNOSIS COMMENT: NORMAL
LAB AP FLOW CYTOMETRY SUMMARY: NORMAL
Lab: NORMAL
PATH REPORT.FINAL DX SPEC: NORMAL
PATH REPORT.GROSS SPEC: NORMAL

## 2025-06-02 ENCOUNTER — OFFICE VISIT (OUTPATIENT)
Dept: SURGERY | Facility: CLINIC | Age: 60
End: 2025-06-02
Payer: COMMERCIAL

## 2025-06-02 VITALS
DIASTOLIC BLOOD PRESSURE: 89 MMHG | BODY MASS INDEX: 33.82 KG/M2 | HEART RATE: 74 BPM | OXYGEN SATURATION: 97 % | SYSTOLIC BLOOD PRESSURE: 136 MMHG | HEIGHT: 71 IN | WEIGHT: 241.6 LBS

## 2025-06-02 DIAGNOSIS — R91.1 LUNG NODULE: Primary | ICD-10-CM

## 2025-06-02 PROCEDURE — 99214 OFFICE O/P EST MOD 30 MIN: CPT | Performed by: THORACIC SURGERY (CARDIOTHORACIC VASCULAR SURGERY)

## 2025-06-02 NOTE — LETTER
"June 9, 2025     Anita Wright MD  [address]  [address]  [facility] [address]    Patient: Bart Tesfaye   YOB: 1965   Date of Visit: 6/2/2025     Dear Anita Wright MD:       Thank you for referring Bart Tesfaye to me for evaluation. Below are the relevant portions of my assessment and plan of care.    If you have questions, please do not hesitate to call me. I look forward to following Bart along with you.         Sincerely,        Chata Cordova MD        CC: No Recipients    Chata Cordova MD  06/09/25 1410  Sign when Signing Visit  Chief Complaint  Follow-up (LUNG NODULE, ION BRONCH, EGD 5/28)    Subjective       History of Present Illness  The patient is a pleasant 59-year-old gentleman who presents for a follow-up after a biopsy of mediastinal lymphadenopathy, lung nodule, and gastric mucosa.    He reports experiencing mild soreness and coughing post-procedure, which have since improved. During the visit, he expressed interest in understanding the potential causes of his inflammation and inquired about possible interventions to reduce it.    History of Present Illness    Objective  Vital Signs:  /89 (BP Location: Right arm, Patient Position: Sitting)   Pulse 74   Ht 180.3 cm (70.98\")   Wt 110 kg (241 lb 9.6 oz)   SpO2 97%   BMI 33.71 kg/m²   Estimated body mass index is 33.71 kg/m² as calculated from the following:    Height as of this encounter: 180.3 cm (70.98\").    Weight as of this encounter: 110 kg (241 lb 9.6 oz).            Physical Exam  Vitals and nursing note reviewed.   Constitutional:       Appearance: He is well-developed.   HENT:      Head: Normocephalic and atraumatic.      Nose: Nose normal.   Eyes:      Conjunctiva/sclera: Conjunctivae normal.   Pulmonary:      Effort: Pulmonary effort is normal.   Musculoskeletal:         General: Normal range of motion.      Cervical back: Normal range of motion and neck supple.   Skin:     General: Skin " is warm and dry.   Neurological:      Mental Status: He is alert and oriented to person, place, and time.   Psychiatric:         Behavior: Behavior normal.         Thought Content: Thought content normal.         Judgment: Judgment normal.          Physical Exam  General: Pleasant 59-year-old gentleman, no acute distress.  Mouth/Throat: Esophagus appeared normal with mild reactive changes, likely due to reflux.    Result Review:           Results  The following results are independently reviewed and interpreted by myself.    Imaging   - PET scan: 05/14/2024, Mild hypermetabolism in the mediastinal and bilateral hilar nodes, no hypermetabolic lung nodules.    Diagnostic Testing   - Biopsy of level 7 lymph node: Inflammation, no malignancy.   - Biopsy of level 10R lymph node: Inflammation, no malignancy.   - Biopsy of level 10L lymph node: Inflammation, no malignancy.   - Biopsy of right lung nodule: Inflammation, no malignancy.   - Biopsy of left lung nodule: Inflammation, no malignancy.   - Esophagus biopsy: Mild reactive changes probably from reflux, no evidence of malignancy or Cade's esophagus.           Assessment and Plan   Diagnoses and all orders for this visit:    1. Lung nodule (Primary)  -     Cancel: CT Chest Without Contrast; Future  -     CT Chest Without Contrast; Future        Assessment & Plan  1. Bilateral lung nodules with mediastinal lymphadenopathy.  The PET scan performed on 05/14/2024 shows mild hypermetabolism in the mediastinal and bilateral hilar nodes but no hypermetabolic lung nodules. Pathology demonstrates inflammation in all biopsy specimens, including the lung nodules on the right and left. All biopsies are benign. The right lower lobe nodule shows inflammation but no malignancy. The left lower lobe nodule also shows inflammation but no malignancy. The lymph nodes in the middle of the chest and at the lung's base show inflammation but no malignancy. The activity in the lymph nodes  is low, suggesting a benign condition with 95% confidence. A CT scan will be scheduled in 3 months to monitor for any changes. If the scan is stable or improved, another scan will be done in 6 months, followed by yearly scans if stable. If there are concerning changes, more aggressive treatments, such as surgery, will be considered.    2. Hypermetabolic esophagus.  The esophagus biopsy shows mild reactive changes likely due to reflux, with no evidence of malignancy or Cade's esophagus. He is advised to start on Nexium (esomeprazole) for reflux management. This medication is available over the counter, and he can choose between Protonix (pantoprazole) or Nexium based on personal preference and effectiveness.    Risks, benefits, and alternatives of treatment were discussed. The patient was informed that the PET scan and biopsy results are encouraging, showing no malignancy but inflammation. The low activity in the lymph nodes suggests a benign condition with 95% confidence. The only way to confirm 100% would be to surgically remove the lymph nodes, which is considered extreme given the current findings. The plan includes monitoring with a CT scan in 3 months, followed by potential further scans if stable. For the hypermetabolic esophagus, starting on antireflux medication like Nexium or Protonix was recommended, with the patient given the option to choose based on personal preference and effectiveness. General health measures such as avoiding dust and sick individuals, wearing a mask, eating healthy, and exercising were advised to help decrease inflammation.         Follow Up   No follow-ups on file.  Patient was given instructions and counseling regarding his condition or for health maintenance advice. Please see specific information pulled into the AVS if appropriate.     Patient or patient representative verbalized consent for the use of Ambient Listening during the visit with  Chata Cordova MD for chart  documentation. 6/9/2025  14:10 EDT

## 2025-06-05 ENCOUNTER — PATIENT ROUNDING (BHMG ONLY) (OUTPATIENT)
Dept: SURGERY | Facility: CLINIC | Age: 60
End: 2025-06-05
Payer: COMMERCIAL

## 2025-06-05 NOTE — PROGRESS NOTES
My name is: Bing      I am with [facility]      I wanted to thank you for allowing [facility] to care for you. It has been sometime since you were seen in our office, and I was wanting to check in on your last office visit with us.      If you could tell me about your visit with us, what things went well?        We're always looking for ways to make our patients' experiences even better. Do you have recommendations on ways we may improve?      Overall were you satisfied with your visit to our practice?        I appreciate you taking the time to answer these few questions today. If there is anything else our office can do for you, please let us know.        Thank you and have a great day.    [employer] MESSAGE SENT.

## 2025-06-09 NOTE — PROGRESS NOTES
"Chief Complaint  Follow-up (LUNG NODULE, ION BRONCH, EGD 5/28)    Subjective        History of Present Illness  The patient is a pleasant 59-year-old gentleman who presents for a follow-up after a biopsy of mediastinal lymphadenopathy, lung nodule, and gastric mucosa.    He reports experiencing mild soreness and coughing post-procedure, which have since improved. During the visit, he expressed interest in understanding the potential causes of his inflammation and inquired about possible interventions to reduce it.    History of Present Illness    Objective   Vital Signs:  /89 (BP Location: Right arm, Patient Position: Sitting)   Pulse 74   Ht 180.3 cm (70.98\")   Wt 110 kg (241 lb 9.6 oz)   SpO2 97%   BMI 33.71 kg/m²   Estimated body mass index is 33.71 kg/m² as calculated from the following:    Height as of this encounter: 180.3 cm (70.98\").    Weight as of this encounter: 110 kg (241 lb 9.6 oz).            Physical Exam  Vitals and nursing note reviewed.   Constitutional:       Appearance: He is well-developed.   HENT:      Head: Normocephalic and atraumatic.      Nose: Nose normal.   Eyes:      Conjunctiva/sclera: Conjunctivae normal.   Pulmonary:      Effort: Pulmonary effort is normal.   Musculoskeletal:         General: Normal range of motion.      Cervical back: Normal range of motion and neck supple.   Skin:     General: Skin is warm and dry.   Neurological:      Mental Status: He is alert and oriented to person, place, and time.   Psychiatric:         Behavior: Behavior normal.         Thought Content: Thought content normal.         Judgment: Judgment normal.          Physical Exam  General: Pleasant 59-year-old gentleman, no acute distress.  Mouth/Throat: Esophagus appeared normal with mild reactive changes, likely due to reflux.    Result Review :           Results  The following results are independently reviewed and interpreted by myself.    Imaging   - PET scan: 05/14/2024, Mild " hypermetabolism in the mediastinal and bilateral hilar nodes, no hypermetabolic lung nodules.    Diagnostic Testing   - Biopsy of level 7 lymph node: Inflammation, no malignancy.   - Biopsy of level 10R lymph node: Inflammation, no malignancy.   - Biopsy of level 10L lymph node: Inflammation, no malignancy.   - Biopsy of right lung nodule: Inflammation, no malignancy.   - Biopsy of left lung nodule: Inflammation, no malignancy.   - Esophagus biopsy: Mild reactive changes probably from reflux, no evidence of malignancy or Cade's esophagus.           Assessment and Plan   Diagnoses and all orders for this visit:    1. Lung nodule (Primary)  -     Cancel: CT Chest Without Contrast; Future  -     CT Chest Without Contrast; Future        Assessment & Plan  1. Bilateral lung nodules with mediastinal lymphadenopathy.  The PET scan performed on 05/14/2024 shows mild hypermetabolism in the mediastinal and bilateral hilar nodes but no hypermetabolic lung nodules. Pathology demonstrates inflammation in all biopsy specimens, including the lung nodules on the right and left. All biopsies are benign. The right lower lobe nodule shows inflammation but no malignancy. The left lower lobe nodule also shows inflammation but no malignancy. The lymph nodes in the middle of the chest and at the lung's base show inflammation but no malignancy. The activity in the lymph nodes is low, suggesting a benign condition with 95% confidence. A CT scan will be scheduled in 3 months to monitor for any changes. If the scan is stable or improved, another scan will be done in 6 months, followed by yearly scans if stable. If there are concerning changes, more aggressive treatments, such as surgery, will be considered.    2. Hypermetabolic esophagus.  The esophagus biopsy shows mild reactive changes likely due to reflux, with no evidence of malignancy or Cade's esophagus. He is advised to start on Nexium (esomeprazole) for reflux management. This  medication is available over the counter, and he can choose between Protonix (pantoprazole) or Nexium based on personal preference and effectiveness.    Risks, benefits, and alternatives of treatment were discussed. The patient was informed that the PET scan and biopsy results are encouraging, showing no malignancy but inflammation. The low activity in the lymph nodes suggests a benign condition with 95% confidence. The only way to confirm 100% would be to surgically remove the lymph nodes, which is considered extreme given the current findings. The plan includes monitoring with a CT scan in 3 months, followed by potential further scans if stable. For the hypermetabolic esophagus, starting on antireflux medication like Nexium or Protonix was recommended, with the patient given the option to choose based on personal preference and effectiveness. General health measures such as avoiding dust and sick individuals, wearing a mask, eating healthy, and exercising were advised to help decrease inflammation.         Follow Up   No follow-ups on file.  Patient was given instructions and counseling regarding his condition or for health maintenance advice. Please see specific information pulled into the AVS if appropriate.     Patient or patient representative verbalized consent for the use of Ambient Listening during the visit with  Chata Cordova MD for chart documentation. 6/9/2025  14:10 EDT

## 2025-06-26 RX ORDER — TADALAFIL 10 MG/1
TABLET ORAL
Qty: 10 TABLET | Refills: 1 | Status: SHIPPED | OUTPATIENT
Start: 2025-06-26

## 2025-07-23 RX ORDER — HYDROCHLOROTHIAZIDE 25 MG/1
25 TABLET ORAL DAILY
Qty: 90 TABLET | Refills: 2 | Status: SHIPPED | OUTPATIENT
Start: 2025-07-23

## 2025-08-06 ENCOUNTER — OFFICE VISIT (OUTPATIENT)
Dept: INTERNAL MEDICINE | Facility: CLINIC | Age: 60
End: 2025-08-06
Payer: COMMERCIAL

## 2025-08-06 VITALS
WEIGHT: 243 LBS | HEIGHT: 70 IN | DIASTOLIC BLOOD PRESSURE: 60 MMHG | BODY MASS INDEX: 34.79 KG/M2 | HEART RATE: 78 BPM | SYSTOLIC BLOOD PRESSURE: 124 MMHG

## 2025-08-06 DIAGNOSIS — I10 ESSENTIAL HYPERTENSION: ICD-10-CM

## 2025-08-06 DIAGNOSIS — Z23 NEED FOR SHINGLES VACCINE: ICD-10-CM

## 2025-08-06 DIAGNOSIS — K76.0 HEPATIC STEATOSIS: ICD-10-CM

## 2025-08-06 DIAGNOSIS — E78.2 MIXED HYPERLIPIDEMIA: ICD-10-CM

## 2025-08-06 DIAGNOSIS — Z00.00 PHYSICAL EXAM, ANNUAL: Primary | ICD-10-CM

## 2025-08-06 DIAGNOSIS — Z76.89 ENCOUNTER FOR WEIGHT MANAGEMENT: ICD-10-CM

## 2025-08-06 PROCEDURE — 99396 PREV VISIT EST AGE 40-64: CPT | Performed by: INTERNAL MEDICINE

## 2025-08-06 PROCEDURE — 90750 HZV VACC RECOMBINANT IM: CPT | Performed by: INTERNAL MEDICINE

## (undated) DEVICE — THE SINGLE USE ETRAP – POLYP TRAP IS USED FOR SUCTION RETRIEVAL OF ENDOSCOPICALLY REMOVED POLYPS.: Brand: ETRAP

## (undated) DEVICE — PK ENDO GI 50

## (undated) DEVICE — THE STERILE LIGHT HANDLE COVER IS USED WITH STERIS SURGICAL LIGHTING AND VISUALIZATION SYSTEMS.

## (undated) DEVICE — SINGLE-USE BIOPSY FORCEPS: Brand: RADIAL JAW 4

## (undated) DEVICE — VISION PROBE: Brand: ION

## (undated) DEVICE — Device: Brand: SINGLE USE ASPIRATION NEEDLE NA-U401SX

## (undated) DEVICE — ADAPT CLN SCPE ENDO PORPOISE BX/50 DISP

## (undated) DEVICE — Device: Brand: ERBE

## (undated) DEVICE — SWIVEL CONNECTOR

## (undated) DEVICE — Device

## (undated) DEVICE — BIOPSY NEEDLE, 21G: Brand: FLEXISION

## (undated) DEVICE — CATHETER: Brand: ION

## (undated) DEVICE — THE STERILE CAMERA HANDLE COVER IS FOR USE WITH THE STERIS SURGICAL LIGHTING AND VISUALIZATION SYSTEMS.

## (undated) DEVICE — VISION PROBE ADAPTER AND SUCTION ADAPTER

## (undated) DEVICE — FLEX ADVANTAGE 1500CC: Brand: FLEX ADVANTAGE

## (undated) DEVICE — GOWN ISOL W/THUMB UNIV BLU BX/15

## (undated) DEVICE — BAPTIST FLOYD BRONCHOSCOPY: Brand: MEDLINE INDUSTRIES, INC.

## (undated) DEVICE — SYRINGE, LUER SLIP, STERILE, 60ML: Brand: MEDLINE

## (undated) DEVICE — KT ORCA ORCAPOD DISP STRL

## (undated) DEVICE — Device: Brand: ION

## (undated) DEVICE — CATHETER GUIDE

## (undated) DEVICE — CANN O2 ETCO2 FITS ALL CONN CO2 SMPL A/ 7IN DISP LF

## (undated) DEVICE — GOWN ,SIRUS,NONREINFORCED 3XL: Brand: MEDLINE

## (undated) DEVICE — VIAL FORMLN CAP 10PCT 20ML

## (undated) DEVICE — BITEBLOCK ENDO W/STRAP 60F A/ LF DISP